# Patient Record
Sex: MALE | Race: WHITE | NOT HISPANIC OR LATINO | URBAN - METROPOLITAN AREA
[De-identification: names, ages, dates, MRNs, and addresses within clinical notes are randomized per-mention and may not be internally consistent; named-entity substitution may affect disease eponyms.]

---

## 2020-08-11 ENCOUNTER — RECORDS - HEALTHEAST (OUTPATIENT)
Dept: LAB | Facility: CLINIC | Age: 65
End: 2020-08-11

## 2020-08-11 LAB
ANION GAP SERPL CALCULATED.3IONS-SCNC: 12 MMOL/L (ref 5–18)
BUN SERPL-MCNC: 11 MG/DL (ref 8–22)
CALCIUM SERPL-MCNC: 10.8 MG/DL (ref 8.5–10.5)
CHLORIDE BLD-SCNC: 104 MMOL/L (ref 98–107)
CO2 SERPL-SCNC: 26 MMOL/L (ref 22–31)
CREAT SERPL-MCNC: 0.85 MG/DL (ref 0.7–1.3)
GFR SERPL CREATININE-BSD FRML MDRD: >60 ML/MIN/1.73M2
GLUCOSE BLD-MCNC: 98 MG/DL (ref 70–125)
LDLC SERPL CALC-MCNC: 132 MG/DL
POTASSIUM BLD-SCNC: 4.8 MMOL/L (ref 3.5–5)
PSA SERPL-MCNC: 2.1 NG/ML (ref 0–4.5)
SODIUM SERPL-SCNC: 142 MMOL/L (ref 136–145)
TSH SERPL DL<=0.005 MIU/L-ACNC: 1.02 UIU/ML (ref 0.3–5)

## 2021-08-02 ENCOUNTER — TRANSCRIBE ORDERS (OUTPATIENT)
Dept: ENDOCRINOLOGY | Facility: CLINIC | Age: 66
End: 2021-08-02

## 2021-08-02 ENCOUNTER — MEDICAL CORRESPONDENCE (OUTPATIENT)
Dept: HEALTH INFORMATION MANAGEMENT | Facility: CLINIC | Age: 66
End: 2021-08-02

## 2021-08-02 DIAGNOSIS — R79.89 LOW TESTOSTERONE: Primary | ICD-10-CM

## 2021-09-01 ENCOUNTER — VIRTUAL VISIT (OUTPATIENT)
Dept: ENDOCRINOLOGY | Facility: CLINIC | Age: 66
End: 2021-09-01
Attending: FAMILY MEDICINE
Payer: COMMERCIAL

## 2021-09-01 DIAGNOSIS — E03.9 HYPOTHYROIDISM, UNSPECIFIED TYPE: Primary | ICD-10-CM

## 2021-09-01 DIAGNOSIS — R79.89 LOW TESTOSTERONE: ICD-10-CM

## 2021-09-01 PROCEDURE — 99204 OFFICE O/P NEW MOD 45 MIN: CPT | Mod: 95 | Performed by: INTERNAL MEDICINE

## 2021-09-01 RX ORDER — LEVOTHYROXINE SODIUM 100 UG/1
TABLET ORAL
COMMUNITY

## 2021-09-01 RX ORDER — LISINOPRIL 10 MG/1
30 TABLET ORAL DAILY
COMMUNITY

## 2021-09-01 RX ORDER — CHLORAL HYDRATE 500 MG
CAPSULE ORAL
COMMUNITY

## 2021-09-01 RX ORDER — LORATADINE 10 MG/1
CAPSULE, LIQUID FILLED ORAL
COMMUNITY

## 2021-09-01 RX ORDER — ATORVASTATIN CALCIUM 40 MG/1
TABLET, FILM COATED ORAL
COMMUNITY
End: 2024-10-04

## 2021-09-01 RX ORDER — MULTIVIT WITH MINERALS/LUTEIN
TABLET ORAL
COMMUNITY

## 2021-09-01 RX ORDER — TESTOSTERONE 40.5 MG/2.5G
GEL TOPICAL
COMMUNITY
Start: 2021-07-29 | End: 2021-12-15

## 2021-09-01 NOTE — PROGRESS NOTES
"Gene is a 65 year old who is being evaluated via a billable video visit.      How would you like to obtain your AVS? Mail a copy  If the video visit is dropped, the invitation should be resent by: Text to cell phone: [phone]  Will anyone else be joining your video visit? No      Video Start Time: 11:27 AM  CC: Low testosterone.     HPI:   Patient presents for evaluation of low testosterone.  ~7  Years ago, he presented with anxiety and being \"overly emotional\". Also notes significant job related stress at that time.   He presented to his primary care and was found to have hypothyroidism and hypogonadism. He was initially on depo-testosterone and then changed to Androgel. Mood improved shortly after medication started. Taking  40.5 mg of testosterone via gel packet once daily (1.62% concentration).     He did have issues with low libido and ED at the time of diagnosis which have resolved.     No history of  surgery.   No TBI.     He has TSAN and uses a CPAP. This was diagnosed around the same time as his hypogonadism.     ROS: 10 point ROS neg other than the symptoms noted above in the HPI.    PMH:   Obesity  Osteopenia  Hypothyroidism  HTN  BANKS  STAN     Meds:  Current Outpatient Medications   Medication     Aspirin 81 MG CAPS     atorvastatin (LIPITOR) 40 MG tablet     fish oil-omega-3 fatty acids 1000 MG capsule     levothyroxine (SYNTHROID) 100 MCG tablet     lisinopril (ZESTRIL) 10 MG tablet     loratadine 10 MG capsule     testosterone (ANDROGEL) 40.5 MG/2.5GM (1.62%) GEL     vitamin C (ASCORBIC ACID) 1000 MG TABS     No current facility-administered medications for this visit.      FHX:   No hsx of clots or prostate cancer.     SHX:  Retired .     Exam:   GENERAL: Healthy, alert and no distress  EYES: Eyes grossly normal to inspection.  No discharge or erythema, or obvious scleral/conjunctival abnormalities.  HENT: Normal cephalic/atraumatic.  External ears, nose and mouth without ulcers or " lesions.  No nasal drainage visible.  RESP: No audible wheeze, cough, or visible cyanosis.  No visible retractions or increased work of breathing.    MS: No gross musculoskeletal defects noted.  Normal range of motion.  No visible edema.  SKIN: Visible skin clear. No significant rash, abnormal pigmentation or lesions.  NEURO: Cranial nerves grossly intact.  Mentation and speech appropriate for age.  PSYCH: Mentation appears normal, affect normal/bright, judgement and insight intact, normal speech and appearance well-groomed.     A/P:   Low testosterone - He baez been on androgel for several years. His body is going to be dependent at this point.Discussed basics of testosterone replacement and what is a reasonable expectation: improvement in libido. Discussed AE's with testosterone. Discussed data on possible increased risk of CV events in patients using testosterone. Discussed how conclusive studies have not been done yet but it does raise concern and caution. He is already on androgel. He has hypothyroidism and is on levothyroxine. We will check a TSH. As his hypothyroidism was diagnosed at the same time as his hypogonadism, I do wonder if his testosterone would have improved on its own after levothyroxine was started. I do no see a prolactin on file so that will need to be checked as well. He has a history of STAN and is using a CPAP. This was untreated prior to his diagnosis of hypogonadism and thus might have been contributing to his low testosterone.   -Schedule labs.   -No change to medication today.   -NEEDS REFILL AFTER COMPLETED.     Hypothyroidism - as above.     Cresencio Bridges M.D    Video-Visit Details    Type of service:  Video Visit    Video End Time:11:52 AM    Originating Location (pt. Location): Home    Distant Location (provider location):  [facility]     Platform used for Video Visit: [employer]

## 2021-09-01 NOTE — LETTER
"    9/1/2021         RE: Miquel Jimenez  [address]  [place] [facility] [address]        Dear Colleague,    Thank you for referring your patient, Miquel Jimenez, to the [facility]. Please see a copy of my visit note below.    Gene is a 65 year old who is being evaluated via a billable video visit.      How would you like to obtain your AVS? Mail a copy  If the video visit is dropped, the invitation should be resent by: Text to cell phone: [phone]  Will anyone else be joining your video visit? No      Video Start Time: 11:27 AM  CC: Low testosterone.     HPI:   Patient presents for evaluation of low testosterone.  ~7  Years ago, he presented with anxiety and being \"overly emotional\". Also notes significant job related stress at that time.   He presented to his primary care and was found to have hypothyroidism and hypogonadism. He was initially on depo-testosterone and then changed to Androgel. Mood improved shortly after medication started. Taking  40.5 mg of testosterone via gel packet once daily (1.62% concentration).     He did have issues with low libido and ED at the time of diagnosis which have resolved.     No history of  surgery.   No TBI.     He has STAN and uses a CPAP. This was diagnosed around the same time as his hypogonadism.     ROS: 10 point ROS neg other than the symptoms noted above in the HPI.    PMH:   Obesity  Osteopenia  Hypothyroidism  HTN  BANKS  STAN     Meds:  Current Outpatient Medications   Medication     Aspirin 81 MG CAPS     atorvastatin (LIPITOR) 40 MG tablet     fish oil-omega-3 fatty acids 1000 MG capsule     levothyroxine (SYNTHROID) 100 MCG tablet     lisinopril (ZESTRIL) 10 MG tablet     loratadine 10 MG capsule     testosterone (ANDROGEL) 40.5 MG/2.5GM (1.62%) GEL     vitamin C (ASCORBIC ACID) 1000 MG TABS     No current facility-administered medications for this visit.      FHX:   No hsx of clots or prostate cancer.     SHX:  Retired .     Exam: "   GENERAL: Healthy, alert and no distress  EYES: Eyes grossly normal to inspection.  No discharge or erythema, or obvious scleral/conjunctival abnormalities.  HENT: Normal cephalic/atraumatic.  External ears, nose and mouth without ulcers or lesions.  No nasal drainage visible.  RESP: No audible wheeze, cough, or visible cyanosis.  No visible retractions or increased work of breathing.    MS: No gross musculoskeletal defects noted.  Normal range of motion.  No visible edema.  SKIN: Visible skin clear. No significant rash, abnormal pigmentation or lesions.  NEURO: Cranial nerves grossly intact.  Mentation and speech appropriate for age.  PSYCH: Mentation appears normal, affect normal/bright, judgement and insight intact, normal speech and appearance well-groomed.     A/P:   Low testosterone - He baez been on androgel for several years. His body is going to be dependent at this point.Discussed basics of testosterone replacement and what is a reasonable expectation: improvement in libido. Discussed AE's with testosterone. Discussed data on possible increased risk of CV events in patients using testosterone. Discussed how conclusive studies have not been done yet but it does raise concern and caution. He is already on androgel. He has hypothyroidism and is on levothyroxine. We will check a TSH. As his hypothyroidism was diagnosed at the same time as his hypogonadism, I do wonder if his testosterone would have improved on its own after levothyroxine was started. I do no see a prolactin on file so that will need to be checked as well. He has a history of STAN and is using a CPAP. This was untreated prior to his diagnosis of hypogonadism and thus might have been contributing to his low testosterone.   -Schedule labs.   -No change to medication today.   -NEEDS REFILL AFTER COMPLETED.     Hypothyroidism - as above.     Cresencio Bridges M.D    Video-Visit Details    Type of service:  Video Visit    Video End Time:11:52  AM    Originating Location (pt. Location): Home    Distant Location (provider location):  [facility]     Platform used for Video Visit: [employer]        Again, thank you for allowing me to participate in the care of your patient.        Sincerely,        Cresencio Bridges MD

## 2021-09-08 ENCOUNTER — LAB (OUTPATIENT)
Dept: LAB | Facility: CLINIC | Age: 66
End: 2021-09-08
Payer: COMMERCIAL

## 2021-09-08 DIAGNOSIS — R79.89 LOW TESTOSTERONE: ICD-10-CM

## 2021-09-08 DIAGNOSIS — E03.9 HYPOTHYROIDISM, UNSPECIFIED TYPE: ICD-10-CM

## 2021-09-08 LAB
HGB BLD-MCNC: 16.9 G/DL (ref 13.3–17.7)
PROLACTIN SERPL-MCNC: 7 UG/L (ref 2–18)
PSA SERPL-MCNC: 2.37 UG/L (ref 0–4)
SHBG SERPL-SCNC: 17 NMOL/L (ref 11–80)
TSH SERPL DL<=0.005 MIU/L-ACNC: 0.69 MU/L (ref 0.4–4)

## 2021-09-08 PROCEDURE — 84270 ASSAY OF SEX HORMONE GLOBUL: CPT

## 2021-09-08 PROCEDURE — 84403 ASSAY OF TOTAL TESTOSTERONE: CPT

## 2021-09-08 PROCEDURE — 36415 COLL VENOUS BLD VENIPUNCTURE: CPT

## 2021-09-08 PROCEDURE — 85018 HEMOGLOBIN: CPT

## 2021-09-08 PROCEDURE — G0103 PSA SCREENING: HCPCS

## 2021-09-08 PROCEDURE — 84443 ASSAY THYROID STIM HORMONE: CPT

## 2021-09-08 PROCEDURE — 84146 ASSAY OF PROLACTIN: CPT

## 2021-09-10 LAB
SHBG SERPL-SCNC: 17 NMOL/L (ref 11–80)
TESTOST FREE SERPL-MCNC: 9.35 NG/DL
TESTOST SERPL-MCNC: 339 NG/DL (ref 240–950)

## 2021-10-16 ENCOUNTER — HEALTH MAINTENANCE LETTER (OUTPATIENT)
Age: 66
End: 2021-10-16

## 2021-10-19 ENCOUNTER — LAB REQUISITION (OUTPATIENT)
Dept: LAB | Facility: CLINIC | Age: 66
End: 2021-10-19
Payer: COMMERCIAL

## 2021-10-19 DIAGNOSIS — E78.5 HYPERLIPIDEMIA, UNSPECIFIED: ICD-10-CM

## 2021-10-19 DIAGNOSIS — R79.89 OTHER SPECIFIED ABNORMAL FINDINGS OF BLOOD CHEMISTRY: ICD-10-CM

## 2021-10-19 DIAGNOSIS — I10 ESSENTIAL (PRIMARY) HYPERTENSION: ICD-10-CM

## 2021-10-19 LAB
ANION GAP SERPL CALCULATED.3IONS-SCNC: 11 MMOL/L (ref 5–18)
BUN SERPL-MCNC: 14 MG/DL (ref 8–22)
CALCIUM SERPL-MCNC: 10.5 MG/DL (ref 8.5–10.5)
CALCIUM, IONIZED MEASURED: 1.3 MMOL/L (ref 1.11–1.3)
CHLORIDE BLD-SCNC: 108 MMOL/L (ref 98–107)
CHOLEST SERPL-MCNC: 191 MG/DL
CO2 SERPL-SCNC: 20 MMOL/L (ref 22–31)
CREAT SERPL-MCNC: 1.07 MG/DL (ref 0.7–1.3)
GFR SERPL CREATININE-BSD FRML MDRD: 72 ML/MIN/1.73M2
GLUCOSE BLD-MCNC: 91 MG/DL (ref 70–125)
HDLC SERPL-MCNC: 40 MG/DL
ION CA PH 7.4: 1.3 MMOL/L (ref 1.11–1.3)
LDLC SERPL CALC-MCNC: 115 MG/DL
PH: 7.4 (ref 7.35–7.45)
POTASSIUM BLD-SCNC: 4.2 MMOL/L (ref 3.5–5)
PTH-INTACT SERPL-MCNC: 96 PG/ML (ref 10–86)
SODIUM SERPL-SCNC: 139 MMOL/L (ref 136–145)
TRIGL SERPL-MCNC: 178 MG/DL

## 2021-10-19 PROCEDURE — 80048 BASIC METABOLIC PNL TOTAL CA: CPT | Mod: ORL | Performed by: FAMILY MEDICINE

## 2021-10-19 PROCEDURE — 82330 ASSAY OF CALCIUM: CPT | Mod: ORL | Performed by: FAMILY MEDICINE

## 2021-10-19 PROCEDURE — 83970 ASSAY OF PARATHORMONE: CPT | Mod: ORL | Performed by: FAMILY MEDICINE

## 2021-10-19 PROCEDURE — 80061 LIPID PANEL: CPT | Mod: ORL | Performed by: FAMILY MEDICINE

## 2021-11-30 ASSESSMENT — ENCOUNTER SYMPTOMS
BACK PAIN: 1
ARTHRALGIAS: 1

## 2021-12-03 ENCOUNTER — VIRTUAL VISIT (OUTPATIENT)
Dept: ENDOCRINOLOGY | Facility: CLINIC | Age: 66
End: 2021-12-03
Payer: COMMERCIAL

## 2021-12-03 DIAGNOSIS — R79.89 LOW TESTOSTERONE: ICD-10-CM

## 2021-12-03 DIAGNOSIS — R53.83 FATIGUE, UNSPECIFIED TYPE: ICD-10-CM

## 2021-12-03 DIAGNOSIS — E03.9 HYPOTHYROIDISM, UNSPECIFIED TYPE: Primary | ICD-10-CM

## 2021-12-03 DIAGNOSIS — Z12.5 ENCOUNTER FOR SCREENING FOR MALIGNANT NEOPLASM OF PROSTATE: ICD-10-CM

## 2021-12-03 DIAGNOSIS — R79.89 ELEVATED PARATHYROID HORMONE: ICD-10-CM

## 2021-12-03 PROCEDURE — 99214 OFFICE O/P EST MOD 30 MIN: CPT | Mod: 95 | Performed by: INTERNAL MEDICINE

## 2021-12-03 NOTE — PROGRESS NOTES
"Gene is a 66 year old who is being evaluated via a billable video visit.      How would you like to obtain your AVS? MyChart  If the video visit is dropped, the invitation should be resent by: Text to cell phone: [phone]   Will anyone else be joining your video visit? No      Video Start Time: 8:59 AM     S:   Patient presents for evaluation of low testosterone.  ~7  Years ago, he presented with anxiety and being \"overly emotional\". Also notes significant job related stress at that time.   He presented to his primary care and was found to have hypothyroidism and hypogonadism. He was initially on depo-testosterone and then changed to Androgel. Mood improved shortly after medication started. Taking  40.5 mg of testosterone via gel packet once daily (1.62% concentration).     He did have issues with low libido and ED at the time of diagnosis which have resolved.     No history of  surgery.   No TBI.     He has STAN and uses a CPAP. This was diagnosed around the same time as his hypogonadism.     In 12/2021, continues levothyroxine 100 mcg every day and 40.5 mg of testosterone via gel packet once daily (1.62% concentration). He is able to get erections but not as frequently as he would like. He has not used viagra. Libido is a little lower than when we last spoke. Also notes low motivation. Wants to start exercising, he is gaining weight, but lacks motivation. Sleeping 6-7 hours a night. Occasionally wakes with back pain. After drinking coffee, he is able to go about his day.     In retrospect, the low motivation began prior to our last visit. Not any worse but not improving.     Answers for HPI/ROS submitted by the patient on 11/30/2021  General Symptoms: No  Skin Symptoms: No  HENT Symptoms: No  EYE SYMPTOMS: No  HEART SYMPTOMS: No  LUNG SYMPTOMS: No  INTESTINAL SYMPTOMS: No  URINARY SYMPTOMS: No  REPRODUCTIVE SYMPTOMS: No  SKELETAL SYMPTOMS: Yes  BLOOD SYMPTOMS: No  NERVOUS SYSTEM SYMPTOMS: No  MENTAL HEALTH " SYMPTOMS: No  Back pain: Yes  Joint pain: Yes      ROS: 10 point ROS neg other than the symptoms noted above in the HPI.    Exam:   GENERAL: Healthy, alert and no distress  EYES: Eyes grossly normal to inspection.  No discharge or erythema, or obvious scleral/conjunctival abnormalities.  HENT: Normal cephalic/atraumatic.  External ears, nose and mouth without ulcers or lesions.  No nasal drainage visible.  RESP: No audible wheeze, cough, or visible cyanosis.  No visible retractions or increased work of breathing.    MS: No gross musculoskeletal defects noted.  Normal range of motion.  No visible edema.  SKIN: Visible skin clear. No significant rash, abnormal pigmentation or lesions.  NEURO: Cranial nerves grossly intact.  Mentation and speech appropriate for age.  PSYCH: Mentation appears normal, affect normal/bright, judgement and insight intact, normal speech and appearance well-groomed.     A/P:   Low testosterone - He baez been on androgel for several years. His body is going to be dependent at this point.Discussed basics of testosterone replacement and what is a reasonable expectation: improvement in libido. Discussed AE's with testosterone. Discussed data on possible increased risk of CV events in patients using testosterone. Discussed how conclusive studies have not been done yet but it does raise concern and caution. He is already on androgel. He has hypothyroidism and is on levothyroxine. We will check a TSH. As his hypothyroidism was diagnosed at the same time as his hypogonadism, I do wonder if his testosterone would have improved on its own after levothyroxine was started. I do no see a prolactin on file so that will need to be checked as well. He has a history of STAN and is using a CPAP. This was untreated prior to his diagnosis of hypogonadism and thus might have been contributing to his low testosterone.   TSH, prolactin, Hgb, testosterone, PSA all normal in 9/2021.   In 12/2021, feeling lower libido  and lack of motivation. Mild elevation in calcium and PTH on labs from 10/2021.   -No change to testosterone or levothyroxine today.   -Labs for CMP, CBC, iPTH, vitamin D, B12, testosterone, PSA, Hgb, TSH.    Hypothyroidism - as above.       Video-Visit Details    Type of service:  Video Visit    Video End Time:9:19 AM    Originating Location (pt. Location): Home    Distant Location (provider location):  [facility]     Platform used for Video Visit: Teresa Bridges MD on 12/3/2021 at 9:19 AM

## 2021-12-03 NOTE — LETTER
"    12/3/2021         RE: Miquel Jimenez  [address]  [place] [address]        Dear Colleague,    Thank you for referring your patient, Miquel Jimenez, to the [facility]. Please see a copy of my visit note below.    Gene is a 66 year old who is being evaluated via a billable video visit.      How would you like to obtain your AVS? MyChart  If the video visit is dropped, the invitation should be resent by: Text to cell phone: [phone]   Will anyone else be joining your video visit? No      Video Start Time: 8:59 AM     S:   Patient presents for evaluation of low testosterone.  ~7  Years ago, he presented with anxiety and being \"overly emotional\". Also notes significant job related stress at that time.   He presented to his primary care and was found to have hypothyroidism and hypogonadism. He was initially on depo-testosterone and then changed to Androgel. Mood improved shortly after medication started. Taking  40.5 mg of testosterone via gel packet once daily (1.62% concentration).     He did have issues with low libido and ED at the time of diagnosis which have resolved.     No history of  surgery.   No TBI.     He has STAN and uses a CPAP. This was diagnosed around the same time as his hypogonadism.     In 12/2021, continues levothyroxine 100 mcg every day and 40.5 mg of testosterone via gel packet once daily (1.62% concentration). He is able to get erections but not as frequently as he would like. He has not used viagra. Libido is a little lower than when we last spoke. Also notes low motivation. Wants to start exercising, he is gaining weight, but lacks motivation. Sleeping 6-7 hours a night. Occasionally wakes with back pain. After drinking coffee, he is able to go about his day.     In retrospect, the low motivation began prior to our last visit. Not any worse but not improving.     Answers for HPI/ROS submitted by the patient on 11/30/2021  General Symptoms: No  Skin " Symptoms: No  HENT Symptoms: No  EYE SYMPTOMS: No  HEART SYMPTOMS: No  LUNG SYMPTOMS: No  INTESTINAL SYMPTOMS: No  URINARY SYMPTOMS: No  REPRODUCTIVE SYMPTOMS: No  SKELETAL SYMPTOMS: Yes  BLOOD SYMPTOMS: No  NERVOUS SYSTEM SYMPTOMS: No  MENTAL HEALTH SYMPTOMS: No  Back pain: Yes  Joint pain: Yes      ROS: 10 point ROS neg other than the symptoms noted above in the HPI.    Exam:   GENERAL: Healthy, alert and no distress  EYES: Eyes grossly normal to inspection.  No discharge or erythema, or obvious scleral/conjunctival abnormalities.  HENT: Normal cephalic/atraumatic.  External ears, nose and mouth without ulcers or lesions.  No nasal drainage visible.  RESP: No audible wheeze, cough, or visible cyanosis.  No visible retractions or increased work of breathing.    MS: No gross musculoskeletal defects noted.  Normal range of motion.  No visible edema.  SKIN: Visible skin clear. No significant rash, abnormal pigmentation or lesions.  NEURO: Cranial nerves grossly intact.  Mentation and speech appropriate for age.  PSYCH: Mentation appears normal, affect normal/bright, judgement and insight intact, normal speech and appearance well-groomed.     A/P:   Low testosterone - He baez been on androgel for several years. His body is going to be dependent at this point.Discussed basics of testosterone replacement and what is a reasonable expectation: improvement in libido. Discussed AE's with testosterone. Discussed data on possible increased risk of CV events in patients using testosterone. Discussed how conclusive studies have not been done yet but it does raise concern and caution. He is already on androgel. He has hypothyroidism and is on levothyroxine. We will check a TSH. As his hypothyroidism was diagnosed at the same time as his hypogonadism, I do wonder if his testosterone would have improved on its own after levothyroxine was started. I do no see a prolactin on file so that will need to be checked as well. He has a  history of STAN and is using a CPAP. This was untreated prior to his diagnosis of hypogonadism and thus might have been contributing to his low testosterone.   TSH, prolactin, Hgb, testosterone, PSA all normal in 9/2021.   In 12/2021, feeling lower libido and lack of motivation. Mild elevation in calcium and PTH on labs from 10/2021.   -No change to testosterone or levothyroxine today.   -Labs for CMP, CBC, iPTH, vitamin D, B12, testosterone, PSA, Hgb, TSH.    Hypothyroidism - as above.       Video-Visit Details    Type of service:  Video Visit    Video End Time:9:19 AM    Originating Location (pt. Location): Home    Distant Location (provider location):  [facility]     Platform used for Video Visit: [facility]     Cresencio Bridges MD on 12/3/2021 at 9:19 AM        Again, thank you for allowing me to participate in the care of your patient.        Sincerely,        Cresencio Bridges MD

## 2021-12-14 ENCOUNTER — LAB (OUTPATIENT)
Dept: LAB | Facility: CLINIC | Age: 66
End: 2021-12-14
Payer: COMMERCIAL

## 2021-12-14 DIAGNOSIS — R53.83 FATIGUE, UNSPECIFIED TYPE: ICD-10-CM

## 2021-12-14 DIAGNOSIS — Z12.5 ENCOUNTER FOR SCREENING FOR MALIGNANT NEOPLASM OF PROSTATE: ICD-10-CM

## 2021-12-14 DIAGNOSIS — R79.89 LOW TESTOSTERONE: ICD-10-CM

## 2021-12-14 DIAGNOSIS — R79.89 ELEVATED PARATHYROID HORMONE: ICD-10-CM

## 2021-12-14 DIAGNOSIS — E03.9 HYPOTHYROIDISM, UNSPECIFIED TYPE: ICD-10-CM

## 2021-12-14 LAB
BASOPHILS # BLD AUTO: 0 10E3/UL (ref 0–0.2)
BASOPHILS NFR BLD AUTO: 1 %
DEPRECATED CALCIDIOL+CALCIFEROL SERPL-MC: 25 UG/L (ref 20–75)
EOSINOPHIL # BLD AUTO: 0.2 10E3/UL (ref 0–0.7)
EOSINOPHIL NFR BLD AUTO: 4 %
ERYTHROCYTE [DISTWIDTH] IN BLOOD BY AUTOMATED COUNT: 12.7 % (ref 10–15)
HCT VFR BLD AUTO: 48.8 % (ref 40–53)
HGB BLD-MCNC: 16.9 G/DL (ref 13.3–17.7)
IMM GRANULOCYTES # BLD: 0.1 10E3/UL
IMM GRANULOCYTES NFR BLD: 1 %
LYMPHOCYTES # BLD AUTO: 1.4 10E3/UL (ref 0.8–5.3)
LYMPHOCYTES NFR BLD AUTO: 26 %
MCH RBC QN AUTO: 31 PG (ref 26.5–33)
MCHC RBC AUTO-ENTMCNC: 34.6 G/DL (ref 31.5–36.5)
MCV RBC AUTO: 89 FL (ref 78–100)
MONOCYTES # BLD AUTO: 0.4 10E3/UL (ref 0–1.3)
MONOCYTES NFR BLD AUTO: 8 %
NEUTROPHILS # BLD AUTO: 3.2 10E3/UL (ref 1.6–8.3)
NEUTROPHILS NFR BLD AUTO: 61 %
PLATELET # BLD AUTO: 186 10E3/UL (ref 150–450)
PTH-INTACT SERPL-MCNC: 58 PG/ML (ref 18–80)
RBC # BLD AUTO: 5.46 10E6/UL (ref 4.4–5.9)
SHBG SERPL-SCNC: 20 NMOL/L (ref 11–80)
VIT B12 SERPL-MCNC: 260 PG/ML (ref 193–986)
WBC # BLD AUTO: 5.3 10E3/UL (ref 4–11)

## 2021-12-14 PROCEDURE — 36415 COLL VENOUS BLD VENIPUNCTURE: CPT

## 2021-12-14 PROCEDURE — G0103 PSA SCREENING: HCPCS

## 2021-12-14 PROCEDURE — 84403 ASSAY OF TOTAL TESTOSTERONE: CPT

## 2021-12-14 PROCEDURE — 82248 BILIRUBIN DIRECT: CPT

## 2021-12-14 PROCEDURE — 84443 ASSAY THYROID STIM HORMONE: CPT

## 2021-12-14 PROCEDURE — 80053 COMPREHEN METABOLIC PANEL: CPT

## 2021-12-14 PROCEDURE — 85025 COMPLETE CBC W/AUTO DIFF WBC: CPT

## 2021-12-14 PROCEDURE — 82607 VITAMIN B-12: CPT

## 2021-12-14 PROCEDURE — 84270 ASSAY OF SEX HORMONE GLOBUL: CPT

## 2021-12-14 PROCEDURE — 84100 ASSAY OF PHOSPHORUS: CPT

## 2021-12-14 PROCEDURE — 83970 ASSAY OF PARATHORMONE: CPT

## 2021-12-14 PROCEDURE — 82306 VITAMIN D 25 HYDROXY: CPT

## 2021-12-14 PROCEDURE — 83735 ASSAY OF MAGNESIUM: CPT

## 2021-12-15 DIAGNOSIS — Z12.5 ENCOUNTER FOR SCREENING FOR MALIGNANT NEOPLASM OF PROSTATE: ICD-10-CM

## 2021-12-15 DIAGNOSIS — R79.89 ELEVATED PARATHYROID HORMONE: ICD-10-CM

## 2021-12-15 DIAGNOSIS — R79.89 LOW TESTOSTERONE: Primary | ICD-10-CM

## 2021-12-15 DIAGNOSIS — E21.1 SECONDARY HYPERPARATHYROIDISM, NOT ELSEWHERE CLASSIFIED (H): ICD-10-CM

## 2021-12-15 LAB
ALBUMIN SERPL-MCNC: 4 G/DL (ref 3.4–5)
ALP SERPL-CCNC: 98 U/L (ref 40–150)
ALT SERPL W P-5'-P-CCNC: 70 U/L (ref 0–70)
ANION GAP SERPL CALCULATED.3IONS-SCNC: 7 MMOL/L (ref 3–14)
AST SERPL W P-5'-P-CCNC: 23 U/L (ref 0–45)
BILIRUB DIRECT SERPL-MCNC: 0.2 MG/DL (ref 0–0.2)
BILIRUB SERPL-MCNC: 0.7 MG/DL (ref 0.2–1.3)
BUN SERPL-MCNC: 10 MG/DL (ref 7–30)
CALCIUM SERPL-MCNC: 10 MG/DL (ref 8.5–10.1)
CHLORIDE BLD-SCNC: 108 MMOL/L (ref 94–109)
CO2 SERPL-SCNC: 25 MMOL/L (ref 20–32)
CREAT SERPL-MCNC: 0.82 MG/DL (ref 0.66–1.25)
GFR SERPL CREATININE-BSD FRML MDRD: >90 ML/MIN/1.73M2
GLUCOSE BLD-MCNC: 99 MG/DL (ref 70–99)
MAGNESIUM SERPL-MCNC: 2.3 MG/DL (ref 1.6–2.3)
PHOSPHATE SERPL-MCNC: 2.8 MG/DL (ref 2.5–4.5)
POTASSIUM BLD-SCNC: 4 MMOL/L (ref 3.4–5.3)
PROT SERPL-MCNC: 7.2 G/DL (ref 6.8–8.8)
PSA SERPL-MCNC: 2.57 UG/L (ref 0–4)
SODIUM SERPL-SCNC: 140 MMOL/L (ref 133–144)
TESTOST FREE SERPL-MCNC: 5.87 NG/DL
TESTOST SERPL-MCNC: 235 NG/DL (ref 240–950)
TSH SERPL DL<=0.005 MIU/L-ACNC: 1.36 MU/L (ref 0.4–4)

## 2021-12-15 RX ORDER — TESTOSTERONE 40.5 MG/2.5G
81 GEL TOPICAL DAILY
Qty: 60 PACKET | Refills: 5 | Status: SHIPPED | OUTPATIENT
Start: 2021-12-15 | End: 2022-07-07

## 2022-01-17 DIAGNOSIS — N52.9 ERECTILE DYSFUNCTION, UNSPECIFIED ERECTILE DYSFUNCTION TYPE: Primary | ICD-10-CM

## 2022-01-17 RX ORDER — SILDENAFIL 100 MG/1
50-100 TABLET, FILM COATED ORAL DAILY PRN
Qty: 10 TABLET | Refills: 11 | Status: SHIPPED | OUTPATIENT
Start: 2022-01-17 | End: 2023-06-19

## 2022-06-03 ENCOUNTER — LAB REQUISITION (OUTPATIENT)
Dept: LAB | Facility: CLINIC | Age: 67
End: 2022-06-03
Payer: COMMERCIAL

## 2022-06-03 DIAGNOSIS — I10 ESSENTIAL (PRIMARY) HYPERTENSION: ICD-10-CM

## 2022-06-03 DIAGNOSIS — E03.9 HYPOTHYROIDISM, UNSPECIFIED: ICD-10-CM

## 2022-06-03 DIAGNOSIS — E29.1 TESTICULAR HYPOFUNCTION: ICD-10-CM

## 2022-06-03 DIAGNOSIS — Z12.5 ENCOUNTER FOR SCREENING FOR MALIGNANT NEOPLASM OF PROSTATE: ICD-10-CM

## 2022-06-03 LAB
ANION GAP SERPL CALCULATED.3IONS-SCNC: 10 MMOL/L (ref 5–18)
BUN SERPL-MCNC: 12 MG/DL (ref 8–22)
CALCIUM SERPL-MCNC: 10.2 MG/DL (ref 8.5–10.5)
CHLORIDE BLD-SCNC: 106 MMOL/L (ref 98–107)
CHOLEST SERPL-MCNC: 179 MG/DL
CO2 SERPL-SCNC: 24 MMOL/L (ref 22–31)
CREAT SERPL-MCNC: 0.76 MG/DL (ref 0.7–1.3)
GFR SERPL CREATININE-BSD FRML MDRD: >90 ML/MIN/1.73M2
GLUCOSE BLD-MCNC: 95 MG/DL (ref 70–125)
HDLC SERPL-MCNC: 41 MG/DL
LDLC SERPL CALC-MCNC: 104 MG/DL
POTASSIUM BLD-SCNC: 4.3 MMOL/L (ref 3.5–5)
PSA SERPL-MCNC: 3.37 UG/L (ref 0–4.5)
SODIUM SERPL-SCNC: 140 MMOL/L (ref 136–145)
TRIGL SERPL-MCNC: 172 MG/DL
TSH SERPL DL<=0.005 MIU/L-ACNC: 1.25 UIU/ML (ref 0.3–5)

## 2022-06-03 PROCEDURE — G0103 PSA SCREENING: HCPCS | Mod: ORL | Performed by: FAMILY MEDICINE

## 2022-06-03 PROCEDURE — 80061 LIPID PANEL: CPT | Mod: ORL | Performed by: FAMILY MEDICINE

## 2022-06-03 PROCEDURE — 84443 ASSAY THYROID STIM HORMONE: CPT | Mod: ORL | Performed by: FAMILY MEDICINE

## 2022-06-03 PROCEDURE — 84403 ASSAY OF TOTAL TESTOSTERONE: CPT | Performed by: FAMILY MEDICINE

## 2022-06-03 PROCEDURE — 84403 ASSAY OF TOTAL TESTOSTERONE: CPT | Mod: ORL | Performed by: FAMILY MEDICINE

## 2022-06-03 PROCEDURE — G0103 PSA SCREENING: HCPCS | Performed by: FAMILY MEDICINE

## 2022-06-03 PROCEDURE — 80048 BASIC METABOLIC PNL TOTAL CA: CPT | Mod: ORL | Performed by: FAMILY MEDICINE

## 2022-06-03 PROCEDURE — 80048 BASIC METABOLIC PNL TOTAL CA: CPT | Performed by: FAMILY MEDICINE

## 2022-06-03 PROCEDURE — 80061 LIPID PANEL: CPT | Performed by: FAMILY MEDICINE

## 2022-06-03 PROCEDURE — 84443 ASSAY THYROID STIM HORMONE: CPT | Performed by: FAMILY MEDICINE

## 2022-06-06 LAB — TESTOST SERPL-MCNC: 521 NG/DL (ref 221–716)

## 2022-07-06 DIAGNOSIS — R79.89 LOW TESTOSTERONE: ICD-10-CM

## 2022-07-06 NOTE — TELEPHONE ENCOUNTER
Requested Prescriptions   Pending Prescriptions Disp Refills     testosterone (ANDROGEL) 40.5 MG/2.5GM (1.62%) GEL 60 packet 5     Sig: Place 2 packets (81 mg) onto the skin daily       There is no refill protocol information for this order        Last office visit: Visit date not found with prescribing provider:  Dr. Bridges    Future Office Visit:          Eric Urias  [facility]

## 2022-07-07 RX ORDER — TESTOSTERONE 40.5 MG/2.5G
81 GEL TOPICAL DAILY
Qty: 60 PACKET | Refills: 0 | Status: SHIPPED | OUTPATIENT
Start: 2022-07-07 | End: 2022-08-11

## 2022-07-07 NOTE — TELEPHONE ENCOUNTER
Testosterone refilled. Patient needs to have follow-up scheduled to establish with new endocrinologist--will forward request to clinic coordinators.  Sukumar Yuan MD 7/7/2022 9:29 AM

## 2022-07-07 NOTE — TELEPHONE ENCOUNTER
testosterone (ANDROGEL) 40.5 MG/2.5GM (1.62%) GEL  Last Written Prescription Date:  12/15/2021  Last Fill Quantity: 60,   # refills: 5  Last Office Visit : 12/3/2021  Future Office visit:  None    Routing refill request to provider for review/approval because:  Dr. Bridges Pt who is gone  Refer to Advice Provider for further review and refills for Pt care.       Yulia Freeman RN  Central Triage Red Flags/Med Refills

## 2022-08-01 DIAGNOSIS — R79.89 LOW TESTOSTERONE: ICD-10-CM

## 2022-08-03 RX ORDER — TESTOSTERONE 40.5 MG/2.5G
81 GEL TOPICAL DAILY
Qty: 60 PACKET | Refills: 0 | OUTPATIENT
Start: 2022-08-03

## 2022-08-03 NOTE — TELEPHONE ENCOUNTER
Patient received 30 day refill 7/7 without scheduling appointment as requested at that time.   No further refills will be given until appointment scheduled/reestablishes care. Last visit with previous provider 12/15/2021

## 2022-08-03 NOTE — TELEPHONE ENCOUNTER
testosterone (ANDROGEL) 40.5 MG/2.5GM (1.62%) GEL      Last Written Prescription Date:  7-7-22  Last Fill Quantity: 60 packets,   # refills: 0  Last Office Visit : 12-3-21 Yuliana  Future Office visit:  none    Routing refill request to provider for review/approval because:  Drug not on the [facility], [facility] or  Health refill protocol or controlled substance  Yuliana pt  Previous hay RF, no appt scheduled.   ( last rx did NOT state RTC instructions)

## 2022-08-11 DIAGNOSIS — R79.89 LOW TESTOSTERONE: ICD-10-CM

## 2022-08-11 RX ORDER — TESTOSTERONE 40.5 MG/2.5G
81 GEL TOPICAL DAILY
Qty: 60 PACKET | Refills: 3 | Status: SHIPPED | OUTPATIENT
Start: 2022-08-11 | End: 2022-12-08

## 2022-08-11 NOTE — TELEPHONE ENCOUNTER
testosterone (ANDROGEL) 40.5 MG/2.5GM (1.62%) GEL  Last Written Prescription Date:   7/7/2022  Last Fill Quantity: 60,   # refills: 0  Last Office Visit :  12/3/2021  Future Office visit:   9/22/2022    Routing refill request to provider for review/approval because:  Pt has visit on 9/22/2022  Med not on protocol  Dr. Bridges Pt who is gone.   Refer to Advice Provider for review       Yulia Freeman RN  Central Triage Red Flags/Med Refills

## 2022-09-22 ENCOUNTER — VIRTUAL VISIT (OUTPATIENT)
Dept: ENDOCRINOLOGY | Facility: CLINIC | Age: 67
End: 2022-09-22
Payer: COMMERCIAL

## 2022-09-22 DIAGNOSIS — R79.89 LOW TESTOSTERONE: Primary | ICD-10-CM

## 2022-09-22 PROCEDURE — 99215 OFFICE O/P EST HI 40 MIN: CPT | Mod: 95 | Performed by: STUDENT IN AN ORGANIZED HEALTH CARE EDUCATION/TRAINING PROGRAM

## 2022-09-22 RX ORDER — CELECOXIB 200 MG/1
200 CAPSULE ORAL DAILY
COMMUNITY
Start: 2022-08-29

## 2022-09-22 RX ORDER — ZINC GLUCONATE 50 MG
50 TABLET ORAL DAILY
COMMUNITY

## 2022-09-22 NOTE — PROGRESS NOTES
Miquel Jimenez is being evaluated via a billable video visit.        How would you like to obtain your AVS? [employer]  For the video visit, send the invitation by: Text to cell phone: [phone]  Will anyone else be joining your video visit? No

## 2022-09-22 NOTE — LETTER
9/22/2022       RE: Miquel Jimenez  [address]  [facility] [address]     Dear Colleague,    Thank you for referring your patient, Miquel Jimenez, to the [facility] ENDOCRINOLOGY CLINIC [facility]. Please see a copy of my visit note below.    Miquel Jimenez is being evaluated via a billable video visit.        How would you like to obtain your AVS? [employer]  For the video visit, send the invitation by: Text to cell phone: [phone]  Will anyone else be joining your video visit? No      Endocrinology Clinic Visit 9/22/2022    Video-Visit Details     Type of service:  Video Visit     Video Start Time: 9:35 am   Video End Time: 10:10 am      I spent a total of 60 minutes on the date of encounter reviewing medical records, evaluating the patient, coordinating care and documenting in the EHR, as detailed above.        Platform used for Video Visit: [employer]     NAME:  Miquel Jimenez  PCP:  Milton Culver  MRN:  [id number]  Reason for Consult:  Hypogonadism and hypothyroidism  Requesting Provider:  Referred Self    Chief Complaint     Chief Complaint   Patient presents with     Follow Up       History of Present Illness     Miquel Jimenez is a 67 year old male who is seen in video visit for hypogonadism and hypothyroidism. Patient was following previously with Dr. Bridges. Last seen 11/2021    He was diagnosed with hypogonadism in 2012 by his PCP and has been on T replacement since then. He presented with low libido, low energy and feeling emotional. He looked at his previous labs ( scanned and attached to [employer] messages), his T level was 249 and his free was normal at 9. No gonadotropin and no other work up. When he established with Dr. Bridges prolactin was checked and was normal.     He was initially on depo-testosterone and then changed to Androgel. Symptoms improved shortly after medication started. Taking  40.5 mg of testosterone via gel  packet once daily (1.62% concentration).      No history of  surgery.   No TBI. Reported previous MRI of brain years ago was done and was normal.     He has STAN and uses a CPAP. This was diagnosed around the same time as his hypogonadism.     He reported being diagnosed with hypothyroidism around the same time. The labs that he showed me back in 2012 had a normal TSH and no FT4 check. He reported being on lt4 at 100 mcg daily since diagnosis. He denied any symptoms of hypo or hyperthyroidism.     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    Problem List     Hypothyroidism  Hypogonadism  STAN  HLD  HTN  Medications     Current Outpatient Medications   Medication     Aspirin 81 MG CAPS     atorvastatin (LIPITOR) 40 MG tablet     celecoxib (CELEBREX) 200 MG capsule     fish oil-omega-3 fatty acids 1000 MG capsule     levothyroxine (SYNTHROID/LEVOTHROID) 100 MCG tablet     lisinopril (ZESTRIL) 10 MG tablet     loratadine 10 MG capsule     sildenafil (VIAGRA) 100 MG tablet     testosterone (ANDROGEL) 40.5 MG/2.5GM (1.62%) GEL     vitamin C (ASCORBIC ACID) 1000 MG TABS     zinc gluconate 50 MG tablet     No current facility-administered medications for this visit.        Allergies     No Known Allergies    Medical / Surgical History   Hypothyroidism  Hypogonadism  STAN  HLD  HTN    Social History     Social History     Socioeconomic History     Marital status:      Spouse name: Not on file     Number of children: Not on file     Years of education: Not on file     Highest education level: Not on file   Occupational History     Not on file   Tobacco Use     Smoking status: Never Smoker     Smokeless tobacco: Former User     Quit date: 1980   Substance and Sexual Activity     Alcohol use: Not on file     Drug use: Not on file     Sexual activity: Not on file   Other Topics Concern     Not on file   Social History Narrative     Not on file     Social Determinants of  Health     Financial Resource Strain: Not on file   Food Insecurity: Not on file   Transportation Needs: Not on file   Physical Activity: Not on file   Stress: Not on file   Social Connections: Not on file   Intimate Partner Violence: Not on file   Housing Stability: Not on file       Family History     Non contributory     ROS     12 ROS completed, pertinent positive and negative in HPI    Physical Exam   There were no vitals taken for this visit.   GENERAL: Healthy, alert and no distress  EYES: Eyes grossly normal to inspection.  No discharge or erythema, or obvious scleral/conjunctival abnormalities.  RESP: No audible wheeze, cough, or visible cyanosis.  No visible retractions or increased work of breathing.    SKIN: Visible skin clear. No significant rash, abnormal pigmentation or lesions.  NEURO: Cranial nerves grossly intact.  Mentation and speech appropriate for age.  PSYCH: Mentation appears normal, affect normal/bright, judgement and insight intact, normal speech and appearance well-groomed.     Labs/Imaging     Pertinent Labs were reviewed and updated in EPIC and discussed briefly.  Radiology Results were  reviewed and updated in EPIC and discussed briefly.    Summary of recent findings:   No results found for: A1C    TSH   Date Value Ref Range Status   06/03/2022 1.25 0.30 - 5.00 uIU/mL Final   12/14/2021 1.36 0.40 - 4.00 mU/L Final   09/08/2021 0.69 0.40 - 4.00 mU/L Final   08/11/2020 1.02 0.30 - 5.00 uIU/mL Final       Creatinine   Date Value Ref Range Status   06/03/2022 0.76 0.70 - 1.30 mg/dL Final       Recent Labs   Lab Test 06/03/22  1128 10/19/21  0956   CHOL 179 191   HDL 41 40    115   TRIG 172* 178*       No results found for: [id number], [id number], [id number]    I personally reviewed the patient's outside records from [facility] EMR and scanned records. Summary of pertinent findings in [facility].    Impression / Plan     1. Hypogonadism  Long history on T replacement since 2012. Diagnosis was not  confirmed at that time, he had slightly low total T with normal Free T. No other hormonal work up. We discussed that at this point after being on T replacment for long time his pituitary access has been suppressed. He prefers to stay on it as his symptoms did improve on it. We reviewed again T side effects including polycythemia and association with CVD and prostate cancer.    Plan:  Due for labs in 3month: T level, cbc and PSA    2. Hypothyroidism  No previous records of abnormal labs. He has been on LT4 100 mcg daily since 2012. Most likely primary hypothyroidism though no labs. Last TSH wnl 6/2022    Plan:  Yearly TFT  Continue LT4 100 mcg daily    Test and/or medications prescribed today:  Orders Placed This Encounter   Procedures     CBC with platelets     PSA tumor marker     Testosterone total         Follow up: 3 month      Deo Still MD  Endocrinology, Diabetes and Metabolism  [facility]

## 2022-09-22 NOTE — PROGRESS NOTES
Endocrinology Clinic Visit 9/22/2022    Video-Visit Details     Type of service:  Video Visit     Video Start Time: 9:35 am   Video End Time: 10:10 am      I spent a total of 60 minutes on the date of encounter reviewing medical records, evaluating the patient, coordinating care and documenting in the EHR, as detailed above.        Platform used for Video Visit: [employer]     NAME:  Miquel Jimenez  PCP:  Milton Culver Dre  MRN:  [id number]  Reason for Consult:  Hypogonadism and hypothyroidism  Requesting Provider:  Referred Self    Chief Complaint     Chief Complaint   Patient presents with     Follow Up       History of Present Illness     Miquel Jimenez is a 67 year old male who is seen in video visit for hypogonadism and hypothyroidism. Patient was following previously with Dr. Bridges. Last seen 11/2021    He was diagnosed with hypogonadism in 2012 by his PCP and has been on T replacement since then. He presented with low libido, low energy and feeling emotional. He looked at his previous labs ( scanned and attached to [employer] messages), his T level was 249 and his free was normal at 9. No gonadotropin and no other work up. When he established with Dr. Bridges prolactin was checked and was normal.     He was initially on depo-testosterone and then changed to Androgel. Symptoms improved shortly after medication started. Taking  40.5 mg of testosterone via gel packet once daily (1.62% concentration).      No history of  surgery.   No TBI. Reported previous MRI of brain years ago was done and was normal.     He has STAN and uses a CPAP. This was diagnosed around the same time as his hypogonadism.     He reported being diagnosed with hypothyroidism around the same time. The labs that he showed me back in 2012 had a normal TSH and no FT4 check. He reported being on lt4 at 100 mcg daily since diagnosis. He denied any symptoms of hypo or hyperthyroidism.     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 Lives with his wife.    Problem List     Hypothyroidism  Hypogonadism  STAN  HLD  HTN  Medications     Current Outpatient Medications   Medication     Aspirin 81 MG CAPS     atorvastatin (LIPITOR) 40 MG tablet     celecoxib (CELEBREX) 200 MG capsule     fish oil-omega-3 fatty acids 1000 MG capsule     levothyroxine (SYNTHROID/LEVOTHROID) 100 MCG tablet     lisinopril (ZESTRIL) 10 MG tablet     loratadine 10 MG capsule     sildenafil (VIAGRA) 100 MG tablet     testosterone (ANDROGEL) 40.5 MG/2.5GM (1.62%) GEL     vitamin C (ASCORBIC ACID) 1000 MG TABS     zinc gluconate 50 MG tablet     No current facility-administered medications for this visit.        Allergies     No Known Allergies    Medical / Surgical History   Hypothyroidism  Hypogonadism  STAN  HLD  HTN    Social History     Social History     Socioeconomic History     Marital status:      Spouse name: Not on file     Number of children: Not on file     Years of education: Not on file     Highest education level: Not on file   Occupational History     Not on file   Tobacco Use     Smoking status: Never Smoker     Smokeless tobacco: Former User     Quit date: 1980   Substance and Sexual Activity     Alcohol use: Not on file     Drug use: Not on file     Sexual activity: Not on file   Other Topics Concern     Not on file   Social History Narrative     Not on file     Social Determinants of Health     Financial Resource Strain: Not on file   Food Insecurity: Not on file   Transportation Needs: Not on file   Physical Activity: Not on file   Stress: Not on file   Social Connections: Not on file   Intimate Partner Violence: Not on file   Housing Stability: Not on file       Family History     Non contributory     ROS     12 ROS completed, pertinent positive and negative in HPI    Physical Exam   There were no vitals taken for this visit.   GENERAL: Healthy, alert and no distress  EYES: Eyes grossly normal to inspection.  No discharge or erythema, or  obvious scleral/conjunctival abnormalities.  RESP: No audible wheeze, cough, or visible cyanosis.  No visible retractions or increased work of breathing.    SKIN: Visible skin clear. No significant rash, abnormal pigmentation or lesions.  NEURO: Cranial nerves grossly intact.  Mentation and speech appropriate for age.  PSYCH: Mentation appears normal, affect normal/bright, judgement and insight intact, normal speech and appearance well-groomed.     Labs/Imaging     Pertinent Labs were reviewed and updated in EPIC and discussed briefly.  Radiology Results were  reviewed and updated in EPIC and discussed briefly.    Summary of recent findings:   No results found for: A1C    TSH   Date Value Ref Range Status   06/03/2022 1.25 0.30 - 5.00 uIU/mL Final   12/14/2021 1.36 0.40 - 4.00 mU/L Final   09/08/2021 0.69 0.40 - 4.00 mU/L Final   08/11/2020 1.02 0.30 - 5.00 uIU/mL Final       Creatinine   Date Value Ref Range Status   06/03/2022 0.76 0.70 - 1.30 mg/dL Final       Recent Labs   Lab Test 06/03/22  1128 10/19/21  0956   CHOL 179 191   HDL 41 40    115   TRIG 172* 178*       No results found for: [id number], [id number], [id number]    I personally reviewed the patient's outside records from [facility] EMR and scanned records. Summary of pertinent findings in [facility].    Impression / Plan     1. Hypogonadism  Long history on T replacement since 2012. Diagnosis was not confirmed at that time, he had slightly low total T with normal Free T. No other hormonal work up. We discussed that at this point after being on T replacment for long time his pituitary access has been suppressed. He prefers to stay on it as his symptoms did improve on it. We reviewed again T side effects including polycythemia and association with CVD and prostate cancer.    Plan:  Due for labs in 3month: T level, cbc and PSA    2. Hypothyroidism  No previous records of abnormal labs. He has been on LT4 100 mcg daily since 2012. Most likely primary  hypothyroidism though no labs. Last TSH wnl 6/2022    Plan:  Yearly TFT  Continue LT4 100 mcg daily    Test and/or medications prescribed today:  Orders Placed This Encounter   Procedures     CBC with platelets     PSA tumor marker     Testosterone total         Follow up: 3 month      Deo Still MD  Endocrinology, Diabetes and Metabolism  [facility]

## 2022-12-05 DIAGNOSIS — R79.89 LOW TESTOSTERONE: ICD-10-CM

## 2022-12-06 NOTE — TELEPHONE ENCOUNTER
testosterone (ANDROGEL) 40.5 MG/2.5GM (1.62%) GEL 60 packet 3 8/11/2022         Last Written Prescription Date:  8-  Last Fill Quantity: 60,   # refills: 3  Last Office Visit : 9-  Future Office visit:  3-    Routing refill request to provider for review/approval because:  CONTROLLED MEDICATION      Kathleen M Doege RN

## 2022-12-08 ENCOUNTER — LAB (OUTPATIENT)
Dept: LAB | Facility: CLINIC | Age: 67
End: 2022-12-08
Payer: COMMERCIAL

## 2022-12-08 DIAGNOSIS — R79.89 LOW TESTOSTERONE: ICD-10-CM

## 2022-12-08 LAB
ERYTHROCYTE [DISTWIDTH] IN BLOOD BY AUTOMATED COUNT: 13 % (ref 10–15)
HCT VFR BLD AUTO: 49.9 % (ref 40–53)
HGB BLD-MCNC: 16.9 G/DL (ref 13.3–17.7)
MCH RBC QN AUTO: 30.3 PG (ref 26.5–33)
MCHC RBC AUTO-ENTMCNC: 33.9 G/DL (ref 31.5–36.5)
MCV RBC AUTO: 89 FL (ref 78–100)
PLATELET # BLD AUTO: 175 10E3/UL (ref 150–450)
PSA SERPL-MCNC: 3.7 NG/ML (ref 0–4.5)
RBC # BLD AUTO: 5.58 10E6/UL (ref 4.4–5.9)
WBC # BLD AUTO: 6.8 10E3/UL (ref 4–11)

## 2022-12-08 PROCEDURE — 84153 ASSAY OF PSA TOTAL: CPT | Performed by: PATHOLOGY

## 2022-12-08 PROCEDURE — 36415 COLL VENOUS BLD VENIPUNCTURE: CPT | Performed by: PATHOLOGY

## 2022-12-08 PROCEDURE — 85027 COMPLETE CBC AUTOMATED: CPT | Performed by: PATHOLOGY

## 2022-12-08 PROCEDURE — 84403 ASSAY OF TOTAL TESTOSTERONE: CPT | Performed by: STUDENT IN AN ORGANIZED HEALTH CARE EDUCATION/TRAINING PROGRAM

## 2022-12-08 NOTE — TELEPHONE ENCOUNTER
Androgen Agents Failed 12/06/2022 03:44 PM   Protocol Details  HCT less than 54% on file within past 12 mos    Refills for this classification require provider review

## 2022-12-15 LAB — TESTOST SERPL-MCNC: 302 NG/DL (ref 240–950)

## 2022-12-15 RX ORDER — TESTOSTERONE 40.5 MG/2.5G
81 GEL TOPICAL DAILY
Qty: 150 G | Refills: 1 | Status: SHIPPED | OUTPATIENT
Start: 2022-12-15 | End: 2023-02-17

## 2023-02-05 ENCOUNTER — HEALTH MAINTENANCE LETTER (OUTPATIENT)
Age: 68
End: 2023-02-05

## 2023-02-17 ENCOUNTER — TELEPHONE (OUTPATIENT)
Dept: ENDOCRINOLOGY | Facility: CLINIC | Age: 68
End: 2023-02-17
Payer: COMMERCIAL

## 2023-02-17 DIAGNOSIS — R79.89 LOW TESTOSTERONE: ICD-10-CM

## 2023-02-17 NOTE — TELEPHONE ENCOUNTER
Script was for 150 g so changed to 180 packets and routed to Dr Tessy Contreras RN on 2/17/2023 at 10:00 AM

## 2023-02-17 NOTE — TELEPHONE ENCOUNTER
[facility] Call Center    Phone Message    May a detailed message be left on voicemail: yes     Reason for Call: Medication Question or concern regarding medication   Prescription Clarification  Name of Medication:testosterone 40.5 MG/2.5GM (1.62%) GEL   Prescribing Provider: Dr Still   Pharmacy: [facility]   What on the order needs clarification? Received an alert that he has no more refills and his record states he should have one more remaining please advise patient          Action Taken: Other: endo    Travel Screening: Not Applicable

## 2023-02-20 RX ORDER — TESTOSTERONE 40.5 MG/2.5G
81 GEL TOPICAL DAILY
Qty: 180 PACKET | Refills: 1 | Status: SHIPPED | OUTPATIENT
Start: 2023-02-20 | End: 2023-05-18

## 2023-03-20 ENCOUNTER — TELEPHONE (OUTPATIENT)
Dept: ENDOCRINOLOGY | Facility: CLINIC | Age: 68
End: 2023-03-20
Payer: COMMERCIAL

## 2023-03-20 NOTE — TELEPHONE ENCOUNTER
Prior Authorization Not Needed per Insurance-2 packets/day is covered. Pharmacy is running claim for 450 packs/30 days!  Medication: testosterone 40.5 MG/2.5GM (1.62%) GEL-PA Not Needed  Insurance Company:    Expected CoPay:      Pharmacy Filling the Rx: [facility] - [place], [place] - [address]  Pharmacy Notified: Yes-I spoke to Marcia at pharmacy. She corrected their claim to run as 180 packs/90 days and it pays. She will contact patient.   Patient Notified: No

## 2023-05-18 ENCOUNTER — VIRTUAL VISIT (OUTPATIENT)
Dept: ENDOCRINOLOGY | Facility: CLINIC | Age: 68
End: 2023-05-18
Payer: COMMERCIAL

## 2023-05-18 DIAGNOSIS — R79.89 LOW TESTOSTERONE: ICD-10-CM

## 2023-05-18 PROCEDURE — 99214 OFFICE O/P EST MOD 30 MIN: CPT | Mod: VID | Performed by: STUDENT IN AN ORGANIZED HEALTH CARE EDUCATION/TRAINING PROGRAM

## 2023-05-18 RX ORDER — TESTOSTERONE 40.5 MG/2.5G
81 GEL TOPICAL DAILY
Qty: 180 PACKET | Refills: 1 | Status: SHIPPED | OUTPATIENT
Start: 2023-05-18 | End: 2023-11-20

## 2023-05-18 ASSESSMENT — ENCOUNTER SYMPTOMS
MUSCLE WEAKNESS: 0
POOR WOUND HEALING: 0
ARTHRALGIAS: 1
JOINT SWELLING: 1
SKIN CHANGES: 0
NECK PAIN: 0
STIFFNESS: 0
BACK PAIN: 1
MUSCLE CRAMPS: 0
NAIL CHANGES: 1
MYALGIAS: 0

## 2023-05-18 NOTE — NURSING NOTE
Is the patient currently in the state of [place]? YES    Visit mode:VIDEO    If the visit is dropped, the patient can be reconnected by: VIDEO VISIT: Text to cell phone: [phone]    Will anyone else be joining the visit? NO      How would you like to obtain your AVS? MyChart    Are changes needed to the allergy or medication list? NO    Reason for visit: Follow Up and Video Visit

## 2023-05-18 NOTE — PROGRESS NOTES
Endocrinology Clinic Visit     Video-Visit Details     Type of service:  Video Visit     Joined the call at 5/18/2023, 10:35:45 am.  Left the call at 5/18/2023, 10:49:01 am.     I spent a total of 30 minutes on the date of encounter reviewing medical records, evaluating the patient, coordinating care and documenting in the EHR, as detailed above.        Patient location home  Provider location off site    Platform used for Video Visit: dakota     NAME:  Miquel Jimenez  PCP:  Milton Culver  MRN:  [id number]  Reason for Consult:  Hypogonadism and hypothyroidism  Requesting Provider:  Referred Self    Chief Complaint     No chief complaint on file.      History of Present Illness     Miquel Jimenez is a 67 year old male who is seen in video visit for hypogonadism and hypothyroidism. Patient was following previously with Dr. Bridges. He established with me on 9/2022.    He was diagnosed with hypogonadism in 2012 by his PCP and has been on T replacement since then. He presented with low libido, low energy and feeling emotional. He looked at his previous labs ( scanned and attached to [employer]), his T level was 249 and his free was normal at 9. No gonadotropin and no other work up. When he established with Dr. Bridges prolactin was checked and was normal.     He was initially on depo-testosterone and then changed to Androgel. Symptoms improved shortly after medication started. Taking  40.5 mg of testosterone via gel packet once daily (1.62% concentration).      No history of  surgery.   No TBI. Reported previous MRI of brain years ago was done and was normal.     He has STAN and uses a CPAP. This was diagnosed around the same time as his hypogonadism.     He reported being diagnosed with hypothyroidism around the same time. The labs that he showed me back in 2012 had a normal TSH and no FT4 check. He reported being on lt4 at 100 mcg daily since diagnosis. He denied any symptoms of hypo or hyperthyroidism.      Interval hx:   He is doing great, he has no complaints or concerns.  No major changes in his meds.        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    Problem List     Hypothyroidism  Hypogonadism  STAN  HLD  HTN  Medications     Current Outpatient Medications   Medication     Aspirin 81 MG CAPS     atorvastatin (LIPITOR) 40 MG tablet     celecoxib (CELEBREX) 200 MG capsule     fish oil-omega-3 fatty acids 1000 MG capsule     levothyroxine (SYNTHROID/LEVOTHROID) 100 MCG tablet     lisinopril (ZESTRIL) 10 MG tablet     loratadine 10 MG capsule     sildenafil (VIAGRA) 100 MG tablet     testosterone 40.5 MG/2.5GM (1.62%) GEL     vitamin C (ASCORBIC ACID) 1000 MG TABS     zinc gluconate 50 MG tablet     No current facility-administered medications for this visit.        Allergies     No Known Allergies    Medical / Surgical History   Hypothyroidism  Hypogonadism  STAN  HLD  HTN    Social History     Social History     Socioeconomic History     Marital status:      Spouse name: Not on file     Number of children: Not on file     Years of education: Not on file     Highest education level: Not on file   Occupational History     Not on file   Tobacco Use     Smoking status: Never     Smokeless tobacco: Former     Quit date: 1980   Vaping Use     Vaping status: Not on file   Substance and Sexual Activity     Alcohol use: Not on file     Drug use: Not on file     Sexual activity: Not on file   Other Topics Concern     Not on file   Social History Narrative     Not on file     Social Determinants of Health     Financial Resource Strain: Not on file   Food Insecurity: Not on file   Transportation Needs: Not on file   Physical Activity: Not on file   Stress: Not on file   Social Connections: Not on file   Intimate Partner Violence: Not on file   Housing Stability: Not on file       Family History     Non contributory     ROS     12 ROS completed, pertinent positive and  negative in HPI    Physical Exam   There were no vitals taken for this visit.   GENERAL: Healthy, alert and no distress  EYES: Eyes grossly normal to inspection.  No discharge or erythema, or obvious scleral/conjunctival abnormalities.  RESP: No audible wheeze, cough, or visible cyanosis.  No visible retractions or increased work of breathing.    SKIN: Visible skin clear. No significant rash, abnormal pigmentation or lesions.  NEURO: Cranial nerves grossly intact.  Mentation and speech appropriate for age.  PSYCH: Mentation appears normal, affect normal/bright, judgement and insight intact, normal speech and appearance well-groomed.     Labs/Imaging     Pertinent Labs were reviewed and updated in EPIC and discussed briefly.  Radiology Results were  reviewed and updated in EPIC and discussed briefly.    Summary of recent findings:   No results found for: A1C    TSH   Date Value Ref Range Status   06/03/2022 1.25 0.30 - 5.00 uIU/mL Final   12/14/2021 1.36 0.40 - 4.00 mU/L Final   09/08/2021 0.69 0.40 - 4.00 mU/L Final   08/11/2020 1.02 0.30 - 5.00 uIU/mL Final       Creatinine   Date Value Ref Range Status   06/03/2022 0.76 0.70 - 1.30 mg/dL Final       Recent Labs   Lab Test 06/03/22  1128 10/19/21  0956   CHOL 179 191   HDL 41 40    115   TRIG 172* 178*       No results found for: [id number], [id number], [id number]    I personally reviewed the patient's outside records from [facility] EMR and scanned records. Summary of pertinent findings in [facility].    Impression / Plan     1. Hypogonadism  Long history on T replacement since 2012. Diagnosis was not confirmed at that time, he had slightly low total T with normal Free T. No other hormonal work up. We discussed that at this point after being on T replacment for long time his pituitary access has been suppressed. He prefers to stay on it as his symptoms did improve on it. We reviewed again T side effects including polycythemia and association with CVD and prostate  cancer.    PSA was trending up but not more than 1.5 increase and still <4. We discussed if any of these changes happen to his PSA we will plan for urology referral. He has no known BPH and no obstructive urinary sx.    Plan:  Due for labs: T level, cbc and PSA       2. Hypothyroidism  No previous records of abnormal labs. He has been on LT4 100 mcg daily since 2012. Most likely primary hypothyroidism though no labs. Last TSH wnl 6/2022    Plan:  Yearly TFT- due for labs  Continue LT4 100 mcg daily    Test and/or medications prescribed today:  No orders of the defined types were placed in this encounter.        Follow up: 6 month      Deo Still MD  Endocrinology, Diabetes and Metabolism  [facility]  Answers for HPI/ROS submitted by the patient on 5/18/2023  General Symptoms: No  Skin Symptoms: Yes  HENT Symptoms: No  EYE SYMPTOMS: No  HEART SYMPTOMS: No  LUNG SYMPTOMS: No  INTESTINAL SYMPTOMS: No  URINARY SYMPTOMS: No  REPRODUCTIVE SYMPTOMS: No  SKELETAL SYMPTOMS: Yes  BLOOD SYMPTOMS: No  NERVOUS SYSTEM SYMPTOMS: No  MENTAL HEALTH SYMPTOMS: No  Changes in hair: No  Changes in moles/birth marks: No  Itching: No  Rashes: No  Changes in nails: Yes  Acne: No  Change in facial hair: No  Warts: Yes  Non-healing sores: No  Scarring: No  Flaking of skin: No  Color changes of hands/feet in cold : No  Sun sensitivity: No  Skin thickening: No  Back pain: Yes  Muscle aches: No  Neck pain: No  Swollen joints: Yes  Joint pain: Yes  Bone pain: No  Muscle cramps: No  Muscle weakness: No  Joint stiffness: No  Bone fracture: No

## 2023-05-18 NOTE — LETTER
5/18/2023       RE: Miquel Jimenez  [address]  [facility] [address]     Dear Colleague,    Thank you for referring your patient, Miquel Jimenez, to the [facility] ENDOCRINOLOGY CLINIC [facility]. Please see a copy of my visit note below.    Endocrinology Clinic Visit     Video-Visit Details     Type of service:  Video Visit     Joined the call at 5/18/2023, 10:35:45 am.  Left the call at 5/18/2023, 10:49:01 am.     I spent a total of 30 minutes on the date of encounter reviewing medical records, evaluating the patient, coordinating care and documenting in the EHR, as detailed above.        Patient location home  Provider location off site    Platform used for Video Visit: [email or web address]     NAME:  Miquel Jimenez  PCP:  Milton Culver  MRN:  [id number]  Reason for Consult:  Hypogonadism and hypothyroidism  Requesting Provider:  Referred Self    Chief Complaint     No chief complaint on file.      History of Present Illness     Miquel Jimenez is a 67 year old male who is seen in video visit for hypogonadism and hypothyroidism. Patient was following previously with Dr. Bridges. He established with me on 9/2022.    He was diagnosed with hypogonadism in 2012 by his PCP and has been on T replacement since then. He presented with low libido, low energy and feeling emotional. He looked at his previous labs ( scanned and attached to [employer]), his T level was 249 and his free was normal at 9. No gonadotropin and no other work up. When he established with Dr. Bridges prolactin was checked and was normal.     He was initially on depo-testosterone and then changed to Androgel. Symptoms improved shortly after medication started. Taking  40.5 mg of testosterone via gel packet once daily (1.62% concentration).      No history of  surgery.   No TBI. Reported previous MRI of brain years ago was done and was normal.     He has STAN and uses a CPAP. This  was diagnosed around the same time as his hypogonadism.     He reported being diagnosed with hypothyroidism around the same time. The labs that he showed me back in 2012 had a normal TSH and no FT4 check. He reported being on lt4 at 100 mcg daily since diagnosis. He denied any symptoms of hypo or hyperthyroidism.     Interval hx:   He is doing great, he has no complaints or concerns.  No major changes in his meds.        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    Problem List     Hypothyroidism  Hypogonadism  STAN  HLD  HTN  Medications     Current Outpatient Medications   Medication    Aspirin 81 MG CAPS    atorvastatin (LIPITOR) 40 MG tablet    celecoxib (CELEBREX) 200 MG capsule    fish oil-omega-3 fatty acids 1000 MG capsule    levothyroxine (SYNTHROID/LEVOTHROID) 100 MCG tablet    lisinopril (ZESTRIL) 10 MG tablet    loratadine 10 MG capsule    sildenafil (VIAGRA) 100 MG tablet    testosterone 40.5 MG/2.5GM (1.62%) GEL    vitamin C (ASCORBIC ACID) 1000 MG TABS    zinc gluconate 50 MG tablet     No current facility-administered medications for this visit.        Allergies     No Known Allergies    Medical / Surgical History   Hypothyroidism  Hypogonadism  STAN  HLD  HTN    Social History     Social History     Socioeconomic History    Marital status:      Spouse name: Not on file    Number of children: Not on file    Years of education: Not on file    Highest education level: Not on file   Occupational History    Not on file   Tobacco Use    Smoking status: Never    Smokeless tobacco: Former     Quit date: 1980   Vaping Use    Vaping status: Not on file   Substance and Sexual Activity    Alcohol use: Not on file    Drug use: Not on file    Sexual activity: Not on file   Other Topics Concern    Not on file   Social History Narrative    Not on file     Social Determinants of Health     Financial Resource Strain: Not on file   Food Insecurity: Not on file    Transportation Needs: Not on file   Physical Activity: Not on file   Stress: Not on file   Social Connections: Not on file   Intimate Partner Violence: Not on file   Housing Stability: Not on file       Family History     Non contributory     ROS     12 ROS completed, pertinent positive and negative in HPI    Physical Exam   There were no vitals taken for this visit.   GENERAL: Healthy, alert and no distress  EYES: Eyes grossly normal to inspection.  No discharge or erythema, or obvious scleral/conjunctival abnormalities.  RESP: No audible wheeze, cough, or visible cyanosis.  No visible retractions or increased work of breathing.    SKIN: Visible skin clear. No significant rash, abnormal pigmentation or lesions.  NEURO: Cranial nerves grossly intact.  Mentation and speech appropriate for age.  PSYCH: Mentation appears normal, affect normal/bright, judgement and insight intact, normal speech and appearance well-groomed.     Labs/Imaging     Pertinent Labs were reviewed and updated in EPIC and discussed briefly.  Radiology Results were  reviewed and updated in EPIC and discussed briefly.    Summary of recent findings:   No results found for: A1C    TSH   Date Value Ref Range Status   06/03/2022 1.25 0.30 - 5.00 uIU/mL Final   12/14/2021 1.36 0.40 - 4.00 mU/L Final   09/08/2021 0.69 0.40 - 4.00 mU/L Final   08/11/2020 1.02 0.30 - 5.00 uIU/mL Final       Creatinine   Date Value Ref Range Status   06/03/2022 0.76 0.70 - 1.30 mg/dL Final       Recent Labs   Lab Test 06/03/22  1128 10/19/21  0956   CHOL 179 191   HDL 41 40    115   TRIG 172* 178*       No results found for: [id number], [id number], [id number]    I personally reviewed the patient's outside records from [facility] EMR and scanned records. Summary of pertinent findings in [facility].    Impression / Plan     1. Hypogonadism  Long history on T replacement since 2012. Diagnosis was not confirmed at that time, he had slightly low total T with normal Free T. No other  hormonal work up. We discussed that at this point after being on T replacment for long time his pituitary access has been suppressed. He prefers to stay on it as his symptoms did improve on it. We reviewed again T side effects including polycythemia and association with CVD and prostate cancer.    PSA was trending up but not more than 1.5 increase and still <4. We discussed if any of these changes happen to his PSA we will plan for urology referral. He has no known BPH and no obstructive urinary sx.    Plan:  Due for labs: T level, cbc and PSA       2. Hypothyroidism  No previous records of abnormal labs. He has been on LT4 100 mcg daily since 2012. Most likely primary hypothyroidism though no labs. Last TSH wnl 6/2022    Plan:  Yearly TFT- due for labs  Continue LT4 100 mcg daily    Test and/or medications prescribed today:  No orders of the defined types were placed in this encounter.        Follow up: 6 month      Deo Still MD  Endocrinology, Diabetes and Metabolism  [facility]  Answers for HPI/ROS submitted by the patient on 5/18/2023  General Symptoms: No  Skin Symptoms: Yes  HENT Symptoms: No  EYE SYMPTOMS: No  HEART SYMPTOMS: No  LUNG SYMPTOMS: No  INTESTINAL SYMPTOMS: No  URINARY SYMPTOMS: No  REPRODUCTIVE SYMPTOMS: No  SKELETAL SYMPTOMS: Yes  BLOOD SYMPTOMS: No  NERVOUS SYSTEM SYMPTOMS: No  MENTAL HEALTH SYMPTOMS: No  Changes in hair: No  Changes in moles/birth marks: No  Itching: No  Rashes: No  Changes in nails: Yes  Acne: No  Change in facial hair: No  Warts: Yes  Non-healing sores: No  Scarring: No  Flaking of skin: No  Color changes of hands/feet in cold : No  Sun sensitivity: No  Skin thickening: No  Back pain: Yes  Muscle aches: No  Neck pain: No  Swollen joints: Yes  Joint pain: Yes  Bone pain: No  Muscle cramps: No  Muscle weakness: No  Joint stiffness: No  Bone fracture: No

## 2023-06-05 ENCOUNTER — HOSPITAL ENCOUNTER (OUTPATIENT)
Facility: CLINIC | Age: 68
Discharge: HOME | End: 2023-06-05
Admitting: FAMILY MEDICINE
Payer: COMMERCIAL

## 2023-06-05 ENCOUNTER — LAB REQUISITION (OUTPATIENT)
Dept: LAB | Facility: CLINIC | Age: 68
End: 2023-06-05
Payer: COMMERCIAL

## 2023-06-05 DIAGNOSIS — Z12.5 ENCOUNTER FOR SCREENING FOR MALIGNANT NEOPLASM OF PROSTATE: ICD-10-CM

## 2023-06-05 DIAGNOSIS — B35.1 TINEA UNGUIUM: ICD-10-CM

## 2023-06-05 DIAGNOSIS — E03.9 HYPOTHYROIDISM, UNSPECIFIED: ICD-10-CM

## 2023-06-05 DIAGNOSIS — E78.2 MIXED HYPERLIPIDEMIA: ICD-10-CM

## 2023-06-05 LAB
ALBUMIN SERPL BCG-MCNC: 4.4 G/DL (ref 3.5–5.2)
ALP SERPL-CCNC: 91 U/L (ref 40–129)
ALT SERPL W P-5'-P-CCNC: 46 U/L (ref 10–50)
ANION GAP SERPL CALCULATED.3IONS-SCNC: 12 MMOL/L (ref 7–15)
AST SERPL W P-5'-P-CCNC: 32 U/L (ref 10–50)
BILIRUB SERPL-MCNC: 0.6 MG/DL
BUN SERPL-MCNC: 14.7 MG/DL (ref 8–23)
CALCIUM SERPL-MCNC: 10.3 MG/DL (ref 8.8–10.2)
CHLORIDE SERPL-SCNC: 104 MMOL/L (ref 98–107)
CHOLEST SERPL-MCNC: 178 MG/DL
CREAT SERPL-MCNC: 0.87 MG/DL (ref 0.67–1.17)
DEPRECATED HCO3 PLAS-SCNC: 24 MMOL/L (ref 22–29)
GFR SERPL CREATININE-BSD FRML MDRD: >90 ML/MIN/1.73M2
GLUCOSE SERPL-MCNC: 91 MG/DL (ref 70–99)
HDLC SERPL-MCNC: 38 MG/DL
LDLC SERPL CALC-MCNC: 102 MG/DL
NONHDLC SERPL-MCNC: 140 MG/DL
POTASSIUM SERPL-SCNC: 4.7 MMOL/L (ref 3.4–5.3)
PROT SERPL-MCNC: 6.7 G/DL (ref 6.4–8.3)
PSA SERPL DL<=0.01 NG/ML-MCNC: 3.39 NG/ML (ref 0–4.5)
SODIUM SERPL-SCNC: 140 MMOL/L (ref 136–145)
TRIGL SERPL-MCNC: 190 MG/DL
TSH SERPL DL<=0.005 MIU/L-ACNC: 1.69 UIU/ML (ref 0.3–4.2)

## 2023-06-05 PROCEDURE — 80053 COMPREHEN METABOLIC PANEL: CPT | Performed by: FAMILY MEDICINE

## 2023-06-05 PROCEDURE — 80061 LIPID PANEL: CPT | Mod: ORL | Performed by: FAMILY MEDICINE

## 2023-06-05 PROCEDURE — 84443 ASSAY THYROID STIM HORMONE: CPT | Mod: ORL | Performed by: FAMILY MEDICINE

## 2023-06-05 PROCEDURE — G0103 PSA SCREENING: HCPCS | Mod: ORL | Performed by: FAMILY MEDICINE

## 2023-06-15 DIAGNOSIS — N52.9 ERECTILE DYSFUNCTION, UNSPECIFIED ERECTILE DYSFUNCTION TYPE: ICD-10-CM

## 2023-06-15 RX ORDER — SILDENAFIL 100 MG/1
50-100 TABLET, FILM COATED ORAL DAILY PRN
Qty: 10 TABLET | Refills: 11 | Status: CANCELLED | OUTPATIENT
Start: 2023-06-15

## 2023-07-07 ENCOUNTER — LAB REQUISITION (OUTPATIENT)
Dept: LAB | Facility: CLINIC | Age: 68
End: 2023-07-07
Payer: COMMERCIAL

## 2023-07-07 ENCOUNTER — HOSPITAL ENCOUNTER (OUTPATIENT)
Facility: CLINIC | Age: 68
Discharge: HOME | End: 2023-07-07
Admitting: FAMILY MEDICINE
Payer: COMMERCIAL

## 2023-07-07 DIAGNOSIS — Z51.81 ENCOUNTER FOR THERAPEUTIC DRUG LEVEL MONITORING: ICD-10-CM

## 2023-07-07 LAB
ALBUMIN SERPL BCG-MCNC: 4.6 G/DL (ref 3.5–5.2)
ALP SERPL-CCNC: 89 U/L (ref 40–129)
ALT SERPL W P-5'-P-CCNC: 36 U/L (ref 0–70)
AST SERPL W P-5'-P-CCNC: 22 U/L (ref 0–45)
BILIRUB DIRECT SERPL-MCNC: <0.2 MG/DL (ref 0–0.3)
BILIRUB SERPL-MCNC: 0.4 MG/DL
PROT SERPL-MCNC: 6.7 G/DL (ref 6.4–8.3)

## 2023-07-07 PROCEDURE — 80076 HEPATIC FUNCTION PANEL: CPT | Mod: ORL | Performed by: FAMILY MEDICINE

## 2023-08-31 ENCOUNTER — LAB REQUISITION (OUTPATIENT)
Dept: LAB | Facility: CLINIC | Age: 68
End: 2023-08-31
Payer: COMMERCIAL

## 2023-08-31 ENCOUNTER — HOSPITAL ENCOUNTER (OUTPATIENT)
Facility: CLINIC | Age: 68
Discharge: HOME | End: 2023-08-31
Admitting: FAMILY MEDICINE
Payer: COMMERCIAL

## 2023-08-31 DIAGNOSIS — Z51.81 ENCOUNTER FOR THERAPEUTIC DRUG LEVEL MONITORING: ICD-10-CM

## 2023-08-31 LAB
ALBUMIN SERPL BCG-MCNC: 4.4 G/DL (ref 3.5–5.2)
ALP SERPL-CCNC: 86 U/L (ref 40–129)
ALT SERPL W P-5'-P-CCNC: 42 U/L (ref 0–70)
AST SERPL W P-5'-P-CCNC: 27 U/L (ref 0–45)
BILIRUB DIRECT SERPL-MCNC: <0.2 MG/DL (ref 0–0.3)
BILIRUB SERPL-MCNC: 0.4 MG/DL
PROT SERPL-MCNC: 6.5 G/DL (ref 6.4–8.3)

## 2023-08-31 PROCEDURE — 80076 HEPATIC FUNCTION PANEL: CPT | Mod: ORL | Performed by: FAMILY MEDICINE

## 2023-11-20 DIAGNOSIS — R79.89 LOW TESTOSTERONE: ICD-10-CM

## 2023-11-21 RX ORDER — TESTOSTERONE 40.5 MG/2.5G
81 GEL TOPICAL DAILY
Qty: 180 PACKET | Refills: 0 | Status: SHIPPED | OUTPATIENT
Start: 2023-11-21 | End: 2024-03-05

## 2023-11-21 NOTE — TELEPHONE ENCOUNTER
testosterone 40.5 MG/2.5GM (1.62%) GEL   Last Written Prescription Date:  5/18/2023  Last Fill Quantity: 180,   # refills: 1  Last Office Visit : 11/20/2023  Future Office visit:  None    Routing refill request to provider for review/approval because:  Drug not on the [facility], P or [facility] refill protocol or controlled substance    Yulia Freeman RN  Central Triage Red Flags/Med Refills

## 2023-11-28 ENCOUNTER — LAB (OUTPATIENT)
Dept: LAB | Facility: CLINIC | Age: 68
End: 2023-11-28
Payer: COMMERCIAL

## 2023-11-28 DIAGNOSIS — R79.89 LOW TESTOSTERONE: ICD-10-CM

## 2023-11-28 LAB
HCT VFR BLD AUTO: 52.9 % (ref 40–53)
PSA SERPL DL<=0.01 NG/ML-MCNC: 3.94 NG/ML (ref 0–4.5)
SHBG SERPL-SCNC: 17 NMOL/L (ref 11–80)
TSH SERPL DL<=0.005 MIU/L-ACNC: 1.29 UIU/ML (ref 0.3–4.2)

## 2023-11-28 PROCEDURE — 36415 COLL VENOUS BLD VENIPUNCTURE: CPT

## 2023-11-28 PROCEDURE — 84270 ASSAY OF SEX HORMONE GLOBUL: CPT

## 2023-11-28 PROCEDURE — 84403 ASSAY OF TOTAL TESTOSTERONE: CPT

## 2023-11-28 PROCEDURE — 84443 ASSAY THYROID STIM HORMONE: CPT

## 2023-11-28 PROCEDURE — 84153 ASSAY OF PSA TOTAL: CPT

## 2023-11-28 PROCEDURE — 85014 HEMATOCRIT: CPT

## 2023-11-30 LAB
TESTOST FREE SERPL-MCNC: 17.53 NG/DL
TESTOST SERPL-MCNC: 596 NG/DL (ref 240–950)

## 2023-12-05 ENCOUNTER — VIRTUAL VISIT (OUTPATIENT)
Dept: ENDOCRINOLOGY | Facility: CLINIC | Age: 68
End: 2023-12-05
Payer: COMMERCIAL

## 2023-12-05 DIAGNOSIS — E03.9 HYPOTHYROIDISM, UNSPECIFIED TYPE: ICD-10-CM

## 2023-12-05 DIAGNOSIS — R79.89 LOW TESTOSTERONE: Primary | ICD-10-CM

## 2023-12-05 PROCEDURE — 99214 OFFICE O/P EST MOD 30 MIN: CPT | Mod: VID | Performed by: STUDENT IN AN ORGANIZED HEALTH CARE EDUCATION/TRAINING PROGRAM

## 2023-12-05 ASSESSMENT — PAIN SCALES - GENERAL: PAINLEVEL: NO PAIN (0)

## 2023-12-05 NOTE — NURSING NOTE
Is the patient currently in the state of [place]? YES    Visit mode:VIDEO    If the visit is dropped, the patient can be reconnected by: VIDEO VISIT: Text to cell phone:   Telephone Information:   Mobile [phone]       Will anyone else be joining the visit? NO  (If patient encounters technical issues they should call [phone])    How would you like to obtain your AVS? MyChart    Are changes needed to the allergy or medication list? No    Reason for visit: RECHECK Shelby Kocher VVF

## 2023-12-05 NOTE — LETTER
12/5/2023       RE: Miquel Jimenez  [address]  [facility] [address]     Dear Colleague,    Thank you for referring your patient, Miquel Jimenez, to the [facility] ENDOCRINOLOGY CLINIC [facility]. Please see a copy of my visit note below.    Endocrinology Clinic Visit     Video-Visit Details     Type of service:  Video Visit     Joined the call at 12/5/2023, 1:36:17 pm.  Left the call at 12/5/2023, 1:50:49 pm.    I spent a total of 30 minutes on the date of encounter reviewing medical records, evaluating the patient, coordinating care and documenting in the EHR, as detailed above.        Patient location home  Provider location off site    Platform used for Video Visit: [email or web address]     NAME:  Miquel Jimenez  PCP:  Milton Culver  MRN:  [id number]  Reason for Consult:  Hypogonadism and hypothyroidism  Requesting Provider:  Referred Self    Chief Complaint     Chief Complaint   Patient presents with    RECHECK       History of Present Illness     Miquel Jimenez is a 67 year old male who is seen in video visit for hypogonadism and hypothyroidism. Patient was following previously with Dr. Bridges. He established with me on 9/2022.    He was diagnosed with hypogonadism in 2012 by his PCP and has been on T replacement since then. He presented with low libido, low energy and feeling emotional. He looked at his previous labs ( scanned and attached to [employer]), his T level was 249 and his free was normal at 9. No gonadotropin and no other work up. When he established with Dr. Bridges prolactin was checked and was normal.     He was initially on depo-testosterone and then changed to Androgel. Symptoms improved shortly after medication started. Taking  40.5 mg of testosterone via gel packet once daily (1.62% concentration).      No history of  surgery.   No TBI. Reported previous MRI of brain years ago was done and was normal.     He has STAN  and uses a CPAP. This was diagnosed around the same time as his hypogonadism.     Interval hx:   He is doing great, he has no complaints or concerns.  No major changes in his meds.   Most recent labs 11/28/2023 total testosterone 596, hematocrit 52.9 and PSA 3.94.  He denies any symptoms of BPH.    # hypothyroidism     He reported being diagnosed with hypothyroidism around the same time. The labs that he showed me back in 2012 had a normal TSH and no FT4 check. He reported being on lt4 at 100 mcg daily since diagnosis. He denied any symptoms of hypo or hyperthyroidism.   Last TSH 11/28/23: WNL AT 1.29.           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    Problem List     Hypothyroidism  Hypogonadism  STAN  HLD  HTN  Medications     Current Outpatient Medications   Medication    Aspirin 81 MG CAPS    atorvastatin (LIPITOR) 40 MG tablet    celecoxib (CELEBREX) 200 MG capsule    fish oil-omega-3 fatty acids 1000 MG capsule    levothyroxine (SYNTHROID/LEVOTHROID) 100 MCG tablet    lisinopril (ZESTRIL) 10 MG tablet    loratadine 10 MG capsule    sildenafil (VIAGRA) 100 MG tablet    testosterone 40.5 MG/2.5GM (1.62%) GEL    vitamin C (ASCORBIC ACID) 1000 MG TABS    zinc gluconate 50 MG tablet     No current facility-administered medications for this visit.        Allergies     No Known Allergies    Medical / Surgical History   Hypothyroidism  Hypogonadism  STAN  HLD  HTN    Social History     Social History     Socioeconomic History    Marital status:      Spouse name: Not on file    Number of children: Not on file    Years of education: Not on file    Highest education level: Not on file   Occupational History    Not on file   Tobacco Use    Smoking status: Never    Smokeless tobacco: Former     Quit date: 1980   Substance and Sexual Activity    Alcohol use: Not on file    Drug use: Not on file    Sexual activity: Not on file   Other Topics Concern    Not on file  "  Social History Narrative    Not on file     Social Determinants of Health     Financial Resource Strain: Not on file   Food Insecurity: Not on file   Transportation Needs: Not on file   Physical Activity: Not on file   Stress: Not on file   Social Connections: Not on file   Interpersonal Safety: Not on file   Housing Stability: Not on file       Family History     Non contributory     ROS     12 ROS completed, pertinent positive and negative in HPI    Physical Exam   There were no vitals taken for this visit.   GENERAL: Healthy, alert and no distress  EYES: Eyes grossly normal to inspection.  No discharge or erythema, or obvious scleral/conjunctival abnormalities.  RESP: No audible wheeze, cough, or visible cyanosis.  No visible retractions or increased work of breathing.    SKIN: Visible skin clear. No significant rash, abnormal pigmentation or lesions.  NEURO: Cranial nerves grossly intact.  Mentation and speech appropriate for age.  PSYCH: Mentation appears normal, affect normal/bright, judgement and insight intact, normal speech and appearance well-groomed.     Labs/Imaging     Pertinent Labs were reviewed and updated in EPIC and discussed briefly.  Radiology Results were  reviewed and updated in EPIC and discussed briefly.    Summary of recent findings:   No results found for: A1C    TSH   Date Value Ref Range Status   11/28/2023 1.29 0.30 - 4.20 uIU/mL Final   06/05/2023 1.69 0.30 - 4.20 uIU/mL Final   06/03/2022 1.25 0.30 - 5.00 uIU/mL Final   12/14/2021 1.36 0.40 - 4.00 mU/L Final   09/08/2021 0.69 0.40 - 4.00 mU/L Final   08/11/2020 1.02 0.30 - 5.00 uIU/mL Final       Creatinine   Date Value Ref Range Status   06/05/2023 0.87 0.67 - 1.17 mg/dL Final       Recent Labs   Lab Test 06/03/22  1128 10/19/21  0956   CHOL 179 191   HDL 41 40    115   TRIG 172* 178*       No results found for: \"[id number]\", \"[id number]\", \"[id number]\"    I personally reviewed the patient's outside records from [employer] EMR and " scanned records. Summary of pertinent findings in HPI.    Impression / Plan     1. Hypogonadism  Long history on T replacement since 2012. Diagnosis was not confirmed at that time, he had slightly low total T with normal Free T. No other hormonal work up. We discussed that at this point after being on T replacment for long time his pituitary access has been suppressed. He prefers to stay on it as his symptoms did improve on it. We reviewed during our initial visit T side effects including polycythemia and association with CVD and prostate cancer.    PSA was trending up but not more than 1.5 increase and still <4.  Most recent PSA is up to 3.94.  We discussed if any of these changes happen to his PSA we will plan for urology referral. He has no known BPH and no obstructive urinary sx. we will plan on rechecking levels in 3-month    Plan:  Labs in 3-month : T level, hematocrit and PSA       2. Hypothyroidism  No previous records of abnormal labs. He has been on LT4 100 mcg daily since 2012. Most likely primary hypothyroidism though no labs. Last TSH wnl November 2023    Plan:  Yearly TFT   Continue LT4 100 mcg daily    3.  Mild intermittent hypercalcemia  He had labs with intermittent mild hypercalcemia alternating with normal levels, most recent calcium checked 6/5/2023 was 10.3.  Labs on 12/14/2021 with calcium of 10, vitamin D 25 and PTH 58.  Old labs in 2021 calcium 10.5 and PTH 96.   We will discuss further next visit.    Test and/or medications prescribed today:  No orders of the defined types were placed in this encounter.        Follow up: 6 month      Deo Still MD  Endocrinology, Diabetes and Metabolism  [facility]

## 2023-12-05 NOTE — PROGRESS NOTES
Endocrinology Clinic Visit     Video-Visit Details     Type of service:  Video Visit     Joined the call at 12/5/2023, 1:36:17 pm.  Left the call at 12/5/2023, 1:50:49 pm.    I spent a total of 30 minutes on the date of encounter reviewing medical records, evaluating the patient, coordinating care and documenting in the EHR, as detailed above.        Patient location home  Provider location off site    Platform used for Video Visit: [email or web address]     NAME:  Miquel Jimenez  PCP:  Milton Culver Dre  MRN:  [id number]  Reason for Consult:  Hypogonadism and hypothyroidism  Requesting Provider:  Referred Self    Chief Complaint     Chief Complaint   Patient presents with    RECHECK       History of Present Illness     Miquel Jimenez is a 67 year old male who is seen in video visit for hypogonadism and hypothyroidism. Patient was following previously with Dr. Bridges. He established with me on 9/2022.    He was diagnosed with hypogonadism in 2012 by his PCP and has been on T replacement since then. He presented with low libido, low energy and feeling emotional. He looked at his previous labs ( scanned and attached to [employer]), his T level was 249 and his free was normal at 9. No gonadotropin and no other work up. When he established with Dr. Bridges prolactin was checked and was normal.     He was initially on depo-testosterone and then changed to Androgel. Symptoms improved shortly after medication started. Taking  40.5 mg of testosterone via gel packet once daily (1.62% concentration).      No history of  surgery.   No TBI. Reported previous MRI of brain years ago was done and was normal.     He has STAN and uses a CPAP. This was diagnosed around the same time as his hypogonadism.     Interval hx:   He is doing great, he has no complaints or concerns.  No major changes in his meds.   Most recent labs 11/28/2023 total testosterone 596, hematocrit 52.9 and PSA 3.94.  He denies any symptoms of BPH.    # hypothyroidism      He reported being diagnosed with hypothyroidism around the same time. The labs that he showed me back in 2012 had a normal TSH and no FT4 check. He reported being on lt4 at 100 mcg daily since diagnosis. He denied any symptoms of hypo or hyperthyroidism.   Last TSH 11/28/23: WNL AT 1.29.           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    Problem List     Hypothyroidism  Hypogonadism  STAN  HLD  HTN  Medications     Current Outpatient Medications   Medication    Aspirin 81 MG CAPS    atorvastatin (LIPITOR) 40 MG tablet    celecoxib (CELEBREX) 200 MG capsule    fish oil-omega-3 fatty acids 1000 MG capsule    levothyroxine (SYNTHROID/LEVOTHROID) 100 MCG tablet    lisinopril (ZESTRIL) 10 MG tablet    loratadine 10 MG capsule    sildenafil (VIAGRA) 100 MG tablet    testosterone 40.5 MG/2.5GM (1.62%) GEL    vitamin C (ASCORBIC ACID) 1000 MG TABS    zinc gluconate 50 MG tablet     No current facility-administered medications for this visit.        Allergies     No Known Allergies    Medical / Surgical History   Hypothyroidism  Hypogonadism  STAN  HLD  HTN    Social History     Social History     Socioeconomic History    Marital status:      Spouse name: Not on file    Number of children: Not on file    Years of education: Not on file    Highest education level: Not on file   Occupational History    Not on file   Tobacco Use    Smoking status: Never    Smokeless tobacco: Former     Quit date: 1980   Substance and Sexual Activity    Alcohol use: Not on file    Drug use: Not on file    Sexual activity: Not on file   Other Topics Concern    Not on file   Social History Narrative    Not on file     Social Determinants of Health     Financial Resource Strain: Not on file   Food Insecurity: Not on file   Transportation Needs: Not on file   Physical Activity: Not on file   Stress: Not on file   Social Connections: Not on file   Interpersonal Safety: Not on file   Housing  "Stability: Not on file       Family History     Non contributory     ROS     12 ROS completed, pertinent positive and negative in HPI    Physical Exam   There were no vitals taken for this visit.   GENERAL: Healthy, alert and no distress  EYES: Eyes grossly normal to inspection.  No discharge or erythema, or obvious scleral/conjunctival abnormalities.  RESP: No audible wheeze, cough, or visible cyanosis.  No visible retractions or increased work of breathing.    SKIN: Visible skin clear. No significant rash, abnormal pigmentation or lesions.  NEURO: Cranial nerves grossly intact.  Mentation and speech appropriate for age.  PSYCH: Mentation appears normal, affect normal/bright, judgement and insight intact, normal speech and appearance well-groomed.     Labs/Imaging     Pertinent Labs were reviewed and updated in EPIC and discussed briefly.  Radiology Results were  reviewed and updated in EPIC and discussed briefly.    Summary of recent findings:   No results found for: A1C    TSH   Date Value Ref Range Status   11/28/2023 1.29 0.30 - 4.20 uIU/mL Final   06/05/2023 1.69 0.30 - 4.20 uIU/mL Final   06/03/2022 1.25 0.30 - 5.00 uIU/mL Final   12/14/2021 1.36 0.40 - 4.00 mU/L Final   09/08/2021 0.69 0.40 - 4.00 mU/L Final   08/11/2020 1.02 0.30 - 5.00 uIU/mL Final       Creatinine   Date Value Ref Range Status   06/05/2023 0.87 0.67 - 1.17 mg/dL Final       Recent Labs   Lab Test 06/03/22  1128 10/19/21  0956   CHOL 179 191   HDL 41 40    115   TRIG 172* 178*       No results found for: \"[id number]\", \"[id number]\", \"[id number]\"    I personally reviewed the patient's outside records from [employer] EMR and scanned records. Summary of pertinent findings in [facility].    Impression / Plan     1. Hypogonadism  Long history on T replacement since 2012. Diagnosis was not confirmed at that time, he had slightly low total T with normal Free T. No other hormonal work up. We discussed that at this point after being on T replacment for " long time his pituitary access has been suppressed. He prefers to stay on it as his symptoms did improve on it. We reviewed during our initial visit T side effects including polycythemia and association with CVD and prostate cancer.    PSA was trending up but not more than 1.5 increase and still <4.  Most recent PSA is up to 3.94.  We discussed if any of these changes happen to his PSA we will plan for urology referral. He has no known BPH and no obstructive urinary sx. we will plan on rechecking levels in 3-month    Plan:  Labs in 3-month : T level, hematocrit and PSA       2. Hypothyroidism  No previous records of abnormal labs. He has been on LT4 100 mcg daily since 2012. Most likely primary hypothyroidism though no labs. Last TSH wnl November 2023    Plan:  Yearly TFT   Continue LT4 100 mcg daily    3.  Mild intermittent hypercalcemia  He had labs with intermittent mild hypercalcemia alternating with normal levels, most recent calcium checked 6/5/2023 was 10.3.  Labs on 12/14/2021 with calcium of 10, vitamin D 25 and PTH 58.  Old labs in 2021 calcium 10.5 and PTH 96.   We will discuss further next visit.    Test and/or medications prescribed today:  No orders of the defined types were placed in this encounter.        Follow up: 6 month      Deo Still MD  Endocrinology, Diabetes and Metabolism  [facility]

## 2023-12-11 ENCOUNTER — TELEPHONE (OUTPATIENT)
Dept: ENDOCRINOLOGY | Facility: CLINIC | Age: 68
End: 2023-12-11
Payer: COMMERCIAL

## 2024-03-03 ENCOUNTER — HEALTH MAINTENANCE LETTER (OUTPATIENT)
Age: 69
End: 2024-03-03

## 2024-03-05 DIAGNOSIS — R79.89 LOW TESTOSTERONE: ICD-10-CM

## 2024-03-05 RX ORDER — TESTOSTERONE 40.5 MG/2.5G
81 GEL TOPICAL DAILY
Qty: 180 PACKET | Refills: 0 | Status: SHIPPED | OUTPATIENT
Start: 2024-03-05 | End: 2024-03-22

## 2024-03-05 NOTE — TELEPHONE ENCOUNTER
testosterone 40.5 MG/2.5GM (1.62%) GEL   180 packet 0 11/21/2023 12/5/2023  [facility] Endocrinology [facility] [place]    Deo Still MD  Endocrinology, Diabetes, and Metabolism    routed because: controlled

## 2024-03-18 ENCOUNTER — LAB (OUTPATIENT)
Dept: LAB | Facility: CLINIC | Age: 69
End: 2024-03-18
Payer: COMMERCIAL

## 2024-03-18 DIAGNOSIS — R79.89 LOW TESTOSTERONE: ICD-10-CM

## 2024-03-18 LAB
HCT VFR BLD AUTO: 55.7 % (ref 40–53)
PSA SERPL DL<=0.01 NG/ML-MCNC: 4.01 NG/ML (ref 0–4.5)
SHBG SERPL-SCNC: 18 NMOL/L (ref 11–80)

## 2024-03-18 PROCEDURE — 36415 COLL VENOUS BLD VENIPUNCTURE: CPT

## 2024-03-18 PROCEDURE — 85014 HEMATOCRIT: CPT

## 2024-03-18 PROCEDURE — 84153 ASSAY OF PSA TOTAL: CPT | Mod: GA

## 2024-03-18 PROCEDURE — 84403 ASSAY OF TOTAL TESTOSTERONE: CPT

## 2024-03-18 PROCEDURE — 84270 ASSAY OF SEX HORMONE GLOBUL: CPT

## 2024-03-20 LAB
TESTOST FREE SERPL-MCNC: 10.76 NG/DL
TESTOST SERPL-MCNC: 393 NG/DL (ref 240–950)

## 2024-03-22 DIAGNOSIS — R79.89 LOW TESTOSTERONE: ICD-10-CM

## 2024-03-22 RX ORDER — TESTOSTERONE 40.5 MG/2.5G
40.5 GEL TOPICAL DAILY
Status: SHIPPED
Start: 2024-03-22 | End: 2024-07-02

## 2024-06-27 ENCOUNTER — LAB (OUTPATIENT)
Dept: LAB | Facility: CLINIC | Age: 69
End: 2024-06-27
Payer: COMMERCIAL

## 2024-06-27 DIAGNOSIS — R79.89 LOW TESTOSTERONE: ICD-10-CM

## 2024-06-27 LAB
HCT VFR BLD AUTO: 50.1 % (ref 40–53)
SHBG SERPL-SCNC: 22 NMOL/L (ref 11–80)

## 2024-06-27 PROCEDURE — 84403 ASSAY OF TOTAL TESTOSTERONE: CPT

## 2024-06-27 PROCEDURE — 84270 ASSAY OF SEX HORMONE GLOBUL: CPT

## 2024-06-27 PROCEDURE — 85014 HEMATOCRIT: CPT

## 2024-06-27 PROCEDURE — 36415 COLL VENOUS BLD VENIPUNCTURE: CPT

## 2024-06-30 LAB
TESTOST FREE SERPL-MCNC: 6.92 NG/DL
TESTOST SERPL-MCNC: 285 NG/DL (ref 240–950)

## 2024-07-02 ENCOUNTER — VIRTUAL VISIT (OUTPATIENT)
Dept: ENDOCRINOLOGY | Facility: CLINIC | Age: 69
End: 2024-07-02
Payer: COMMERCIAL

## 2024-07-02 DIAGNOSIS — R97.20 ELEVATED PSA: ICD-10-CM

## 2024-07-02 DIAGNOSIS — E03.9 HYPOTHYROIDISM, UNSPECIFIED TYPE: Primary | ICD-10-CM

## 2024-07-02 DIAGNOSIS — E66.9 OBESITY, UNSPECIFIED CLASSIFICATION, UNSPECIFIED OBESITY TYPE, UNSPECIFIED WHETHER SERIOUS COMORBIDITY PRESENT: ICD-10-CM

## 2024-07-02 DIAGNOSIS — N52.9 ERECTILE DYSFUNCTION, UNSPECIFIED ERECTILE DYSFUNCTION TYPE: ICD-10-CM

## 2024-07-02 DIAGNOSIS — R79.89 LOW TESTOSTERONE: ICD-10-CM

## 2024-07-02 PROCEDURE — 99215 OFFICE O/P EST HI 40 MIN: CPT | Mod: 95 | Performed by: STUDENT IN AN ORGANIZED HEALTH CARE EDUCATION/TRAINING PROGRAM

## 2024-07-02 PROCEDURE — G2211 COMPLEX E/M VISIT ADD ON: HCPCS | Mod: 95 | Performed by: STUDENT IN AN ORGANIZED HEALTH CARE EDUCATION/TRAINING PROGRAM

## 2024-07-02 RX ORDER — SILDENAFIL 100 MG/1
50-100 TABLET, FILM COATED ORAL DAILY PRN
Qty: 10 TABLET | Refills: 11 | Status: SHIPPED | OUTPATIENT
Start: 2024-07-02 | End: 2024-10-04

## 2024-07-02 RX ORDER — TESTOSTERONE 40.5 MG/2.5G
81 GEL TOPICAL DAILY
Qty: 90 PACKET | Refills: 1 | Status: SHIPPED | OUTPATIENT
Start: 2024-07-02 | End: 2024-07-29

## 2024-07-02 NOTE — LETTER
7/2/2024       RE: Miquel Jimenez  [address]  [facility] [address]     Dear Colleague,    Thank you for referring your patient, Miquel Jimenez, to the [facility] ENDOCRINOLOGY CLINIC [facility]. Please see a copy of my visit note below.    Endocrinology Clinic Visit     Video-Visit Details     Type of service:  Video Visit   Joined the call at 7/2/2024, 11:10:09 am.  Left the call at 7/2/2024, 11:25:35 am.    I spent a total of 40 minutes on the date of encounter reviewing medical records, evaluating the patient, coordinating care and documenting in the EHR, as detailed above.        Patient location home  Provider location off site    Platform used for Video Visit: dakota     NAME:  Miquel Jimenez  PCP:  Milton Culver  MRN:  [id number]  Reason for Consult:  Hypogonadism and hypothyroidism  Requesting Provider:  Referred Self    Chief Complaint     Chief Complaint   Patient presents with    RECHECK       History of Present Illness     Miquel Jimenez is a 67 year old male who is seen in video visit for hypogonadism and hypothyroidism. Patient was following previously with Dr. Bridges. He established with me on 9/2022. Last seen 12/2023    He was diagnosed with hypogonadism in 2012 by his PCP and has been on T replacement since then. He presented with low libido, low energy and feeling emotional. He looked at his previous labs ( scanned and attached to [employer]), his T level was 249 and his free was normal at 9. No gonadotropin and no other work up. When he established with Dr. Bridges prolactin was checked and was normal.     He was initially on depo-testosterone and then changed to Androgel. Symptoms improved shortly after medication started. Taking  40.5 mg of testosterone via gel 2 packets once daily (1.62% concentration).      No history of  surgery.   No TBI. Reported previous MRI of brain years ago was done and was  normal.     He has STAN and uses a CPAP. This was diagnosed around the same time as his hypogonadism.     Interval hx:   He is doing great, he has no complaints or concerns.  No urinary sx. PSA at the upper limit , remained stable.  On 3/2024 his hematocrit was up to 55.7  The dose of androgel was cut down to 1 pack daily  He cut back for 3 weeks but then started doing alternating 1 pack and 2 pack EOD.  Most recent labs    Latest Reference Range & Units 06/27/24 15:05   Free Testosterone Calculated ng/dL 6.92   Testosterone Total 240 - 950 ng/dL 285   Hematocrit 40.0 - 53.0 % 50.1       # hypothyroidism     He reported being diagnosed with hypothyroidism around the same time. The labs that he showed me back in 2012 had a normal TSH and no FT4 check. He reported being on lt4 at 100 mcg daily since diagnosis. He denied any symptoms of hypo or hyperthyroidism.   Last TSH 11/28/23: WNL AT 1.29.           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    Problem List     Hypothyroidism  Hypogonadism  STAN  HLD  HTN  Medications     Current Outpatient Medications   Medication Sig Dispense Refill    sildenafil (VIAGRA) 100 MG tablet Take 0.5-1 tablets ( mg) by mouth daily as needed (sexual activity) 10 tablet 11    testosterone 40.5 MG/2.5GM (1.62%) GEL Place 2 packets (81 mg) onto the skin daily Take 1 packet alternating with 2 packets every other day 90 packet 1    Aspirin 81 MG CAPS 81mg tab      atorvastatin (LIPITOR) 40 MG tablet       celecoxib (CELEBREX) 200 MG capsule Take 200 mg by mouth daily      fish oil-omega-3 fatty acids 1000 MG capsule       levothyroxine (SYNTHROID/LEVOTHROID) 100 MCG tablet 1 tab(s)      lisinopril (ZESTRIL) 10 MG tablet 1      loratadine 10 MG capsule 10mg tab      vitamin C (ASCORBIC ACID) 1000 MG TABS       zinc gluconate 50 MG tablet Take 50 mg by mouth daily       No current facility-administered medications for this visit.         Allergies     No Known Allergies    Medical / Surgical History   Hypothyroidism  Hypogonadism  STAN  HLD  HTN    Social History     Social History     Socioeconomic History    Marital status:      Spouse name: Not on file    Number of children: Not on file    Years of education: Not on file    Highest education level: Not on file   Occupational History    Not on file   Tobacco Use    Smoking status: Never    Smokeless tobacco: Former     Quit date: 1980   Substance and Sexual Activity    Alcohol use: Not on file    Drug use: Not on file    Sexual activity: Not on file   Other Topics Concern    Not on file   Social History Narrative    Not on file     Social Determinants of Health     Financial Resource Strain: Not on file   Food Insecurity: Not on file   Transportation Needs: Not on file   Physical Activity: Not on file   Stress: Not on file   Social Connections: Not on file   Interpersonal Safety: Not on file   Housing Stability: Not on file       Family History     Non contributory     ROS     12 ROS completed, pertinent positive and negative in HPI    Physical Exam   There were no vitals taken for this visit.   GENERAL: Healthy, alert and no distress  EYES: Eyes grossly normal to inspection.  No discharge or erythema, or obvious scleral/conjunctival abnormalities.  RESP: No audible wheeze, cough, or visible cyanosis.  No visible retractions or increased work of breathing.    SKIN: Visible skin clear. No significant rash, abnormal pigmentation or lesions.  NEURO: Cranial nerves grossly intact.  Mentation and speech appropriate for age.  PSYCH: Mentation appears normal, affect normal/bright, judgement and insight intact, normal speech and appearance well-groomed.     Labs/Imaging     Pertinent Labs were reviewed and updated in EPIC and discussed briefly.  Radiology Results were  reviewed and updated in EPIC and discussed briefly.    Summary of recent findings:   No results found for: A1C    TSH   Date  "Value Ref Range Status   11/28/2023 1.29 0.30 - 4.20 uIU/mL Final   06/05/2023 1.69 0.30 - 4.20 uIU/mL Final   06/03/2022 1.25 0.30 - 5.00 uIU/mL Final   12/14/2021 1.36 0.40 - 4.00 mU/L Final   09/08/2021 0.69 0.40 - 4.00 mU/L Final   08/11/2020 1.02 0.30 - 5.00 uIU/mL Final       Creatinine   Date Value Ref Range Status   06/05/2023 0.87 0.67 - 1.17 mg/dL Final       Recent Labs   Lab Test 06/03/22  1128 10/19/21  0956   CHOL 179 191   HDL 41 40    115   TRIG 172* 178*       No results found for: \"NNWK94PXDVI\", \"[id number]\", \"[id number]\"    I personally reviewed the patient's outside records from [facility] EMR and scanned records. Summary of pertinent findings in HPI.    Impression / Plan     1. Hypogonadism  Long history on T replacement since 2012. Diagnosis was not confirmed at that time, he had slightly low total T with normal Free T. No other hormonal work up. We discussed that at this point after being on T replacment for long time his pituitary access has been suppressed. He prefers to stay on it as his symptoms did improve on it. We reviewed during our initial visit T side effects including polycythemia and association with CVD and prostate cancer.    His hematocrit trended up to 55 on 3/2024. We decreased his T from 2 pack daily to 1 pack daily. He has been alternating 1 and 2 pack EOD. His most recent labs 6/27/24 hematocrit down to 50.     PSA was trending up but not more than 1.5 increase and still <4.  We discussed if any of these changes happen to his PSA we will plan for urology referral. He has no known BPH and no obstructive urinary sx. we will plan on rechecking levels in 3-month    Plan:  continue Transdermal T 1 pack alternating with 2 pack EOD.  Labs in 6month : T level, hematocrit and PSA       2. Hypothyroidism  No previous records of abnormal labs. He has been on LT4 100 mcg daily since 2012. Most likely primary hypothyroidism though no labs. Last TSH wnl November 2023    Plan:  Yearly " TFT   Continue LT4 100 mcg daily    3.  Mild intermittent hypercalcemia  He had labs with intermittent mild hypercalcemia alternating with normal levels, most recent calcium checked 6/5/2023 was 10.3.  Labs on 12/14/2021 with calcium of 10, vitamin D 25 and PTH 58.  Old labs in 2021 calcium 10.5 and PTH 96. No calcium check since 6/2023. He has upcoming visit with PCP at a local clinic. He will repeat labs and get back to me. Based on results we may decided on further work up.    4. Obesity, unsure of BMI  No recent wt and ht. He is interested in wt loss with lifestyle changes initially. Referral placed.     Test and/or medications prescribed today:  Orders Placed This Encounter   Procedures    TSH with free T4 reflex    Calcium    Albumin level    PSA tumor marker    Hematocrit    Adult Comprehensive Weight Management  Referral    Testosterone Free and Total         Follow up: 1 year  The longitudinal plan of care for the diagnosis(es)/condition(s) as documented were addressed during this visit. Due to the added complexity in care, I will continue to support Bill in the subsequent management and with ongoing continuity of care.      Deo Still MD  Endocrinology, Diabetes and Metabolism  [facility]

## 2024-07-02 NOTE — PROGRESS NOTES
Endocrinology Clinic Visit     Video-Visit Details     Type of service:  Video Visit   Joined the call at 7/2/2024, 11:10:09 am.  Left the call at 7/2/2024, 11:25:35 am.    I spent a total of 40 minutes on the date of encounter reviewing medical records, evaluating the patient, coordinating care and documenting in the EHR, as detailed above.        Patient location home  Provider location off site    Platform used for Video Visit: dakota     NAME:  Miquel Jimenez  PCP:  Milton Culver Dre  MRN:  [id number]  Reason for Consult:  Hypogonadism and hypothyroidism  Requesting Provider:  Referred Self    Chief Complaint     Chief Complaint   Patient presents with    RECHECK       History of Present Illness     Miquel Jimenez is a 67 year old male who is seen in video visit for hypogonadism and hypothyroidism. Patient was following previously with Dr. Bridges. He established with me on 9/2022. Last seen 12/2023    He was diagnosed with hypogonadism in 2012 by his PCP and has been on T replacement since then. He presented with low libido, low energy and feeling emotional. He looked at his previous labs ( scanned and attached to [employer] his T level was 249 and his free was normal at 9. No gonadotropin and no other work up. When he established with Dr. Bridges prolactin was checked and was normal.     He was initially on depo-testosterone and then changed to Androgel. Symptoms improved shortly after medication started. Taking  40.5 mg of testosterone via gel 2 packets once daily (1.62% concentration).      No history of  surgery.   No TBI. Reported previous MRI of brain years ago was done and was normal.     He has STAN and uses a CPAP. This was diagnosed around the same time as his hypogonadism.     Interval hx:   He is doing great, he has no complaints or concerns.  No urinary sx. PSA at the upper limit , remained stable.  On 3/2024 his hematocrit was up to 55.7  The dose of androgel was cut down to 1 pack  daily  He cut back for 3 weeks but then started doing alternating 1 pack and 2 pack EOD.  Most recent labs    Latest Reference Range & Units 06/27/24 15:05   Free Testosterone Calculated ng/dL 6.92   Testosterone Total 240 - 950 ng/dL 285   Hematocrit 40.0 - 53.0 % 50.1       # hypothyroidism     He reported being diagnosed with hypothyroidism around the same time. The labs that he showed me back in 2012 had a normal TSH and no FT4 check. He reported being on lt4 at 100 mcg daily since diagnosis. He denied any symptoms of hypo or hyperthyroidism.   Last TSH 11/28/23: WNL AT 1.29.           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    Problem List     Hypothyroidism  Hypogonadism  STAN  HLD  HTN  Medications     Current Outpatient Medications   Medication Sig Dispense Refill    sildenafil (VIAGRA) 100 MG tablet Take 0.5-1 tablets ( mg) by mouth daily as needed (sexual activity) 10 tablet 11    testosterone 40.5 MG/2.5GM (1.62%) GEL Place 2 packets (81 mg) onto the skin daily Take 1 packet alternating with 2 packets every other day 90 packet 1    Aspirin 81 MG CAPS 81mg tab      atorvastatin (LIPITOR) 40 MG tablet       celecoxib (CELEBREX) 200 MG capsule Take 200 mg by mouth daily      fish oil-omega-3 fatty acids 1000 MG capsule       levothyroxine (SYNTHROID/LEVOTHROID) 100 MCG tablet 1 tab(s)      lisinopril (ZESTRIL) 10 MG tablet 1      loratadine 10 MG capsule 10mg tab      vitamin C (ASCORBIC ACID) 1000 MG TABS       zinc gluconate 50 MG tablet Take 50 mg by mouth daily       No current facility-administered medications for this visit.        Allergies     No Known Allergies    Medical / Surgical History   Hypothyroidism  Hypogonadism  STAN  HLD  HTN    Social History     Social History     Socioeconomic History    Marital status:      Spouse name: Not on file    Number of children: Not on file    Years of education: Not on file    Highest education  level: Not on file   Occupational History    Not on file   Tobacco Use    Smoking status: Never    Smokeless tobacco: Former     Quit date: 1980   Substance and Sexual Activity    Alcohol use: Not on file    Drug use: Not on file    Sexual activity: Not on file   Other Topics Concern    Not on file   Social History Narrative    Not on file     Social Determinants of Health     Financial Resource Strain: Not on file   Food Insecurity: Not on file   Transportation Needs: Not on file   Physical Activity: Not on file   Stress: Not on file   Social Connections: Not on file   Interpersonal Safety: Not on file   Housing Stability: Not on file       Family History     Non contributory     ROS     12 ROS completed, pertinent positive and negative in HPI    Physical Exam   There were no vitals taken for this visit.   GENERAL: Healthy, alert and no distress  EYES: Eyes grossly normal to inspection.  No discharge or erythema, or obvious scleral/conjunctival abnormalities.  RESP: No audible wheeze, cough, or visible cyanosis.  No visible retractions or increased work of breathing.    SKIN: Visible skin clear. No significant rash, abnormal pigmentation or lesions.  NEURO: Cranial nerves grossly intact.  Mentation and speech appropriate for age.  PSYCH: Mentation appears normal, affect normal/bright, judgement and insight intact, normal speech and appearance well-groomed.     Labs/Imaging     Pertinent Labs were reviewed and updated in EPIC and discussed briefly.  Radiology Results were  reviewed and updated in EPIC and discussed briefly.    Summary of recent findings:   No results found for: A1C    TSH   Date Value Ref Range Status   11/28/2023 1.29 0.30 - 4.20 uIU/mL Final   06/05/2023 1.69 0.30 - 4.20 uIU/mL Final   06/03/2022 1.25 0.30 - 5.00 uIU/mL Final   12/14/2021 1.36 0.40 - 4.00 mU/L Final   09/08/2021 0.69 0.40 - 4.00 mU/L Final   08/11/2020 1.02 0.30 - 5.00 uIU/mL Final       Creatinine   Date Value Ref Range Status  "  06/05/2023 0.87 0.67 - 1.17 mg/dL Final       Recent Labs   Lab Test 06/03/22  1128 10/19/21  0956   CHOL 179 191   HDL 41 40    115   TRIG 172* 178*       No results found for: \"[id number]\", \"[id number]\", \"[id number]\"    I personally reviewed the patient's outside records from [facility] EMR and scanned records. Summary of pertinent findings in HPI.    Impression / Plan     1. Hypogonadism  Long history on T replacement since 2012. Diagnosis was not confirmed at that time, he had slightly low total T with normal Free T. No other hormonal work up. We discussed that at this point after being on T replacment for long time his pituitary access has been suppressed. He prefers to stay on it as his symptoms did improve on it. We reviewed during our initial visit T side effects including polycythemia and association with CVD and prostate cancer.    His hematocrit trended up to 55 on 3/2024. We decreased his T from 2 pack daily to 1 pack daily. He has been alternating 1 and 2 pack EOD. His most recent labs 6/27/24 hematocrit down to 50.     PSA was trending up but not more than 1.5 increase and still <4.  We discussed if any of these changes happen to his PSA we will plan for urology referral. He has no known BPH and no obstructive urinary sx. we will plan on rechecking levels in 3-month    Plan:  continue Transdermal T 1 pack alternating with 2 pack EOD.  Labs in 6month : T level, hematocrit and PSA       2. Hypothyroidism  No previous records of abnormal labs. He has been on LT4 100 mcg daily since 2012. Most likely primary hypothyroidism though no labs. Last TSH wnl November 2023    Plan:  Yearly TFT   Continue LT4 100 mcg daily    3.  Mild intermittent hypercalcemia  He had labs with intermittent mild hypercalcemia alternating with normal levels, most recent calcium checked 6/5/2023 was 10.3.  Labs on 12/14/2021 with calcium of 10, vitamin D 25 and PTH 58.  Old labs in 2021 calcium 10.5 and PTH 96. No calcium check " since 6/2023. He has upcoming visit with PCP at a local clinic. He will repeat labs and get back to me. Based on results we may decided on further work up.    4. Obesity, unsure of BMI  No recent wt and ht. He is interested in wt loss with lifestyle changes initially. Referral placed.     Test and/or medications prescribed today:  Orders Placed This Encounter   Procedures    TSH with free T4 reflex    Calcium    Albumin level    PSA tumor marker    Hematocrit    Adult Comprehensive Weight Management  Referral    Testosterone Free and Total         Follow up: 1 year  The longitudinal plan of care for the diagnosis(es)/condition(s) as documented were addressed during this visit. Due to the added complexity in care, I will continue to support Bill in the subsequent management and with ongoing continuity of care.      Deo Still MD  Endocrinology, Diabetes and Metabolism  [facility]

## 2024-07-02 NOTE — PATIENT INSTRUCTIONS
It was nice seeing you.    Continue testosterone at 1 packet alternating with 2 packets every other day    Testosterone labs in 6 month    Due for thyroid labs November 2024    I recommend rechecking calcium, given mild elevation the past.

## 2024-07-02 NOTE — NURSING NOTE
Is the patient currently in the state of [place]? YES    Visit mode:VIDEO    If the visit is dropped, the patient can be reconnected by: VIDEO VISIT: Text to cell phone:   Telephone Information:   Mobile [phone]       Will anyone else be joining the visit? NO  (If patient encounters technical issues they should call [phone] :[id number])    How would you like to obtain your AVS? MyChart    Are changes needed to the allergy or medication list? Pt stated no med changes    Are refills needed on medications prescribed by this physician? YES- pt needs to discuss with the provider depending on labs    Reason for visit: RECHEVY [place] VVF    Knee pain is chronic - pt receives injection next week for it.  Pain is situational. Depending if he kneels on it.

## 2024-07-25 ENCOUNTER — TELEPHONE (OUTPATIENT)
Dept: ENDOCRINOLOGY | Facility: CLINIC | Age: 69
End: 2024-07-25
Payer: COMMERCIAL

## 2024-07-25 NOTE — TELEPHONE ENCOUNTER
Patient confirmed scheduled appointment:  Date: 7/7/25  Time: 8:30 am  Visit type: return endo  Provider: Dr. Stlil  Location: virtual  Testing/imaging: labs 7/30/24, 11/11/24, 1/6/25  Additional notes: NA

## 2024-07-29 ENCOUNTER — MYC REFILL (OUTPATIENT)
Dept: ENDOCRINOLOGY | Facility: CLINIC | Age: 69
End: 2024-07-29
Payer: COMMERCIAL

## 2024-07-29 DIAGNOSIS — R79.89 LOW TESTOSTERONE: ICD-10-CM

## 2024-07-29 RX ORDER — TESTOSTERONE 40.5 MG/2.5G
81 GEL TOPICAL DAILY
Qty: 90 PACKET | Refills: 1 | Status: SHIPPED | OUTPATIENT
Start: 2024-07-29

## 2024-07-30 ENCOUNTER — LAB (OUTPATIENT)
Dept: LAB | Facility: CLINIC | Age: 69
End: 2024-07-30
Payer: COMMERCIAL

## 2024-07-30 DIAGNOSIS — R79.89 LOW TESTOSTERONE: ICD-10-CM

## 2024-07-30 LAB
ALBUMIN SERPL BCG-MCNC: 4.4 G/DL (ref 3.5–5.2)
CALCIUM SERPL-MCNC: 10.5 MG/DL (ref 8.8–10.4)
HOLD SPECIMEN: NORMAL
HOLD SPECIMEN: NORMAL

## 2024-07-30 PROCEDURE — 36415 COLL VENOUS BLD VENIPUNCTURE: CPT

## 2024-07-30 PROCEDURE — 82310 ASSAY OF CALCIUM: CPT

## 2024-07-30 PROCEDURE — 82040 ASSAY OF SERUM ALBUMIN: CPT

## 2024-08-05 DIAGNOSIS — E03.9 HYPOTHYROIDISM, UNSPECIFIED TYPE: Primary | ICD-10-CM

## 2024-08-05 DIAGNOSIS — E83.52 HYPERCALCEMIA: ICD-10-CM

## 2024-09-24 ENCOUNTER — LAB REQUISITION (OUTPATIENT)
Dept: LAB | Facility: CLINIC | Age: 69
End: 2024-09-24
Payer: COMMERCIAL

## 2024-09-24 DIAGNOSIS — E78.2 MIXED HYPERLIPIDEMIA: ICD-10-CM

## 2024-09-24 DIAGNOSIS — E03.9 HYPOTHYROIDISM, UNSPECIFIED: ICD-10-CM

## 2024-09-24 DIAGNOSIS — Z12.5 ENCOUNTER FOR SCREENING FOR MALIGNANT NEOPLASM OF PROSTATE: ICD-10-CM

## 2024-09-24 DIAGNOSIS — K75.81 NONALCOHOLIC STEATOHEPATITIS (NASH): ICD-10-CM

## 2024-09-24 LAB
ALBUMIN SERPL BCG-MCNC: 4.5 G/DL (ref 3.5–5.2)
ALP SERPL-CCNC: 82 U/L (ref 40–150)
ALT SERPL W P-5'-P-CCNC: 42 U/L (ref 0–70)
ANION GAP SERPL CALCULATED.3IONS-SCNC: 10 MMOL/L (ref 7–15)
AST SERPL W P-5'-P-CCNC: 27 U/L (ref 0–45)
BILIRUB SERPL-MCNC: 0.5 MG/DL
BUN SERPL-MCNC: 13 MG/DL (ref 8–23)
CALCIUM SERPL-MCNC: 10.7 MG/DL (ref 8.8–10.4)
CHLORIDE SERPL-SCNC: 105 MMOL/L (ref 98–107)
CHOLEST SERPL-MCNC: 183 MG/DL
CREAT SERPL-MCNC: 0.82 MG/DL (ref 0.67–1.17)
EGFRCR SERPLBLD CKD-EPI 2021: >90 ML/MIN/1.73M2
FASTING STATUS PATIENT QL REPORTED: ABNORMAL
FASTING STATUS PATIENT QL REPORTED: ABNORMAL
GLUCOSE SERPL-MCNC: 105 MG/DL (ref 70–99)
HCO3 SERPL-SCNC: 23 MMOL/L (ref 22–29)
HDLC SERPL-MCNC: 39 MG/DL
LDLC SERPL CALC-MCNC: 108 MG/DL
NONHDLC SERPL-MCNC: 144 MG/DL
POTASSIUM SERPL-SCNC: 4 MMOL/L (ref 3.4–5.3)
PROT SERPL-MCNC: 6.8 G/DL (ref 6.4–8.3)
PSA SERPL DL<=0.01 NG/ML-MCNC: 3.5 NG/ML (ref 0–4.5)
SODIUM SERPL-SCNC: 138 MMOL/L (ref 135–145)
TRIGL SERPL-MCNC: 182 MG/DL
TSH SERPL DL<=0.005 MIU/L-ACNC: 1.06 UIU/ML (ref 0.3–4.2)
VIT D+METAB SERPL-MCNC: 26 NG/ML (ref 20–50)

## 2024-09-24 PROCEDURE — 80061 LIPID PANEL: CPT | Mod: ORL | Performed by: FAMILY MEDICINE

## 2024-09-24 PROCEDURE — 80053 COMPREHEN METABOLIC PANEL: CPT | Mod: ORL | Performed by: FAMILY MEDICINE

## 2024-09-24 PROCEDURE — 84403 ASSAY OF TOTAL TESTOSTERONE: CPT | Mod: ORL | Performed by: FAMILY MEDICINE

## 2024-09-24 PROCEDURE — 82306 VITAMIN D 25 HYDROXY: CPT | Mod: ORL | Performed by: FAMILY MEDICINE

## 2024-09-24 PROCEDURE — G0103 PSA SCREENING: HCPCS | Mod: ORL | Performed by: FAMILY MEDICINE

## 2024-09-24 PROCEDURE — 84443 ASSAY THYROID STIM HORMONE: CPT | Mod: ORL | Performed by: FAMILY MEDICINE

## 2024-09-26 LAB — TESTOST SERPL-MCNC: 308 NG/DL (ref 240–950)

## 2024-10-03 ENCOUNTER — LAB REQUISITION (OUTPATIENT)
Dept: LAB | Facility: CLINIC | Age: 69
End: 2024-10-03
Payer: COMMERCIAL

## 2024-10-03 DIAGNOSIS — E83.52 HYPERCALCEMIA: ICD-10-CM

## 2024-10-03 LAB — CA-I BLD-MCNC: 5.4 MG/DL (ref 4.4–5.2)

## 2024-10-03 PROCEDURE — 82330 ASSAY OF CALCIUM: CPT | Mod: ORL | Performed by: FAMILY MEDICINE

## 2024-10-03 NOTE — PROGRESS NOTES
Medication Therapy Management (MTM) Encounter    ASSESSMENT:                            Medication Adherence/Access: No issues identified    Hypertension   Patient is not meeting blood pressure goal of < 140/90mmHg. Just started higher dose of lisinopril 2 days ago and starting Wegovy for weight loss. Will continue to monitor.    Hypothyroidism   Stable    Supplements   Stable     Obesity  Weight loss will likely help with elevated BP and OA pain.   Patient would benefit fromstarting Wegovy 0.25mg and titrating as tolerated .       PLAN:                            1. Continue current dose of lisinopril 30mg once daily. We discussed it can take a week or two to see the effect of medication dose change. Additionally, blood pressure may improve over the next few weeks/months with weight loss.    2. Start Wegovy 0.25mg injected once weekly. We were able to provide a sample in clinic today, and I will send in a prescription for the next dose, 0.5mg to the pharmacy. This medication decreases appetite, and slows how you absorb sugar into your blood.  It is generally very effective in helping with weight loss. There are some heart and kidney protective benefits long-term. Side effects we monitor for include nausea, vomiting, bloating, constipation or diarrhea, and abdominal pain.      Follow-up: Return in 20 days (on 10/24/2024) for Medication dose check, by phone. Then later in November for in person BP and weight check.    SUBJECTIVE/OBJECTIVE:                          Gene Jimenez is a 69 year old male seen for an initial visit. He was referred to me from Milton Culver.      Reason for visit: Medication review.    Allergies/ADRs: None  Past Medical History: Reviewed in chart  Tobacco: He reports that he has never smoked. He quit smokeless tobacco use about 44 years ago.  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 Culver; has taken this dose for 2 days so far  Patient reports no current medication side effects  Patient self monitors blood pressure.  Home BP monitoring 153/92, 148/95 .       Hypothyroidism   Levothyroxine 100 mcg daily.   Patient is having the following symptoms: none.   TSH   Date Value Ref Range Status   09/24/2024 1.06 0.30 - 4.20 uIU/mL Final   06/03/2022 1.25 0.30 - 5.00 uIU/mL Final   12/14/2021 1.36 0.40 - 4.00 mU/L Final        Supplements   Multivitamin daily  Vitamin C 1000mg 1-2 tablets daily  Vitamin D 2000IU 2 capsules daily  Zinc 30mg once daily  Fish Oil 1000mg 1-2 capsules daily  No reported issues at this time.        Weight Management   Wegovy 0.25 mg once weekly - hasn't started yet, here for teaching. Has not yet picked up from pharmacy    Nutrition/Eating Habits: Diet is \"ok\"; meat and potatos, not a lot of veggies. Loves food, loves to cook.    Exercise/Activity: 3 days per week at the gym and just started this about a month ago, active with woodworking and gardening.   Medications Tried/Failed:  None.   Medical History:  MEN2/Medullary Thyroid Cancer: none  Pancreatitis: none  Baseline GI symptomatology: none     Initial Consult Weight: 252 (9/24/24)     Current weight today: 255 lbs 3.2 oz  Cumulative Weight Gain: 3 lb, 1.1% from baseline    Wt Readings from Last 4 Encounters:   10/04/24 255 lb 3.2 oz (115.8 kg)     Estimated body mass index is 35.59 kg/m  as calculated from the following:    Height as of this encounter: 5' 11\" (1.803 m).    Weight as of this encounter: 255 lb 3.2 oz (115.8 kg).      Today's Vitals: BP (!) 160/80   Ht 5' 11\" (1.803 m)   Wt 255 lb 3.2 oz (115.8 kg)   BMI 35.59 kg/m    ----------------      I spent 45 minutes with this patient " today. All changes were made via collaborative practice agreement with Milton Culver MD. A copy of the visit note was provided to the patient's provider(s).    A summary of these recommendations was given to the patient.    Gladis Ulrich, Pharm.D., [facility]  Medication Therapy Management Pharmacist  [phone]          Medication Therapy Recommendations  Obesity due to excess calories, unspecified obesity severity    Current Medication: Semaglutide-Weight Management (WEGOVY SC)   Rationale: Does not understand instructions - Adherence - Adherence   Recommendation: Provide Education   Status: Patient Agreed - Adherence/Education

## 2024-10-04 ENCOUNTER — OFFICE VISIT (OUTPATIENT)
Dept: PHARMACY | Facility: PHYSICIAN GROUP | Age: 69
End: 2024-10-04
Payer: COMMERCIAL

## 2024-10-04 VITALS
BODY MASS INDEX: 35.73 KG/M2 | HEIGHT: 71 IN | WEIGHT: 255.2 LBS | SYSTOLIC BLOOD PRESSURE: 160 MMHG | DIASTOLIC BLOOD PRESSURE: 80 MMHG

## 2024-10-04 DIAGNOSIS — E03.9 HYPOTHYROIDISM, UNSPECIFIED TYPE: ICD-10-CM

## 2024-10-04 DIAGNOSIS — R79.89 LOW TESTOSTERONE: ICD-10-CM

## 2024-10-04 DIAGNOSIS — Z78.9 TAKES DIETARY SUPPLEMENTS: ICD-10-CM

## 2024-10-04 DIAGNOSIS — E66.09 OBESITY DUE TO EXCESS CALORIES, UNSPECIFIED OBESITY SEVERITY: ICD-10-CM

## 2024-10-04 DIAGNOSIS — I10 HYPERTENSION, ESSENTIAL: Primary | ICD-10-CM

## 2024-10-04 PROCEDURE — 99207 PR NO CHARGE LOS: CPT | Performed by: PHARMACIST

## 2024-10-04 NOTE — PATIENT INSTRUCTIONS
Recommendations from today's MTM visit:                                                      ASSESSMENT:                            Medication Adherence/Access: No issues identified    Hypertension   Patient is not meeting blood pressure goal of < 140/90mmHg. Just started higher dose of lisinopril 2 days ago and starting Wegovy for weight loss. Will continue to monitor.    Hypothyroidism   Stable    Supplements   Stable     Obesity  Weight loss will likely help with elevated BP and OA pain.   Patient would benefit fromstarting Wegovy 0.25mg and titrating as tolerated.       PLAN:                            1. Continue current dose of lisinopril 30mg once daily. We discussed it can take a week or two to see the effect of medication dose change. Additionally, blood pressure may improve over the next few weeks/months with weight loss.    2. Start Wegovy 0.25mg injected once weekly. We were able to provide a sample in clinic today, and I will send in a prescription for the next dose, 0.5mg to the pharmacy. This medication decreases appetite, and slows how you absorb sugar into your blood.  It is generally very effective in helping with weight loss. There are some heart and kidney protective benefits long-term. Side effects we monitor for include nausea, vomiting, bloating, constipation or diarrhea, and abdominal pain.      Follow-up: Return in 20 days (on 10/24/2024) for Medication dose check, by phone. Then later in November for in person BP and weight check.    It was great to speak with you today.  I value your experience and would be very thankful for your time with providing feedback on our clinic survey. You may receive a survey via email or text message in the next few days.     To schedule another MTM appointment, please call the clinic directly or you may call the scheduling line at [phone].    My Clinical Pharmacist's contact information:                                                      Please feel  free to contact me with any questions or concerns you have.      Gladis Ulrich, Pharm.D., [facility]  Medication Therapy Management Pharmacist  [phone]

## 2024-10-23 NOTE — PROGRESS NOTES
Medication Therapy Management (MTM) Encounter    ASSESSMENT:                            Medication Adherence/Access: Cost issues identified    Hypertension   Patient is not meeting blood pressure goal of < 140/90mmHg. He would likely benefit from addition of hydrochlorothiazide for further BP control.     Obesity  Weight loss will likely help with elevated BP and OA pain, GLP-1 RA not covered, will need to consider more affordable alternatives.        PLAN:                            1. Continue current dose of lisinopril 30mg once daily AND start taking hydrochlorothiazide 25mg one tablet by mouth every morning for further reduction to blood pressure. Continue to monitor BP, and watch for side effects including increased urination, dizziness or lightheadedness.    2. We'll discuss alternative weight loss medications at your next appointment, after your upcoming trip.      Follow-up: Return in 27 days (on 11/20/2024) for Medication Therapy Management. Then later in November for in person BP and weight check.    SUBJECTIVE/OBJECTIVE:                          Gene Jiemnez is a 69 year old male seen for an initial visit. He was referred to me from Milton Culver.      Reason for visit: Medication review.    Allergies/ADRs: None  Past Medical History: Reviewed in chart  Tobacco: He reports that he has never smoked. He quit smokeless tobacco use about 44 years ago.  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    Medication Adherence/Access: no issues reported  Leaving Saturday for above trip    Hypertension   Lisinopril 30mg daily - dose increased at last visit with Dr. Culver  Patient reports no current medication side effects  Patient self monitors blood pressure.  Home BP monitoring   146/86mmHg avg over the last few weeks,  "159/91mmHg this morning     Last Comprehensive Metabolic Panel:  Lab Results   Component Value Date     09/24/2024    POTASSIUM 4.0 09/24/2024    CHLORIDE 105 09/24/2024    CO2 23 09/24/2024    ANIONGAP 10 09/24/2024     (H) 09/24/2024    BUN 13.0 09/24/2024    CR 0.82 09/24/2024    GFRESTIMATED >90 09/24/2024    STEFAN 10.7 (H) 09/24/2024       Weight Management   Wegovy 0.25 mg once weekly - started at last visit and will take last dose tomorrow, denies side effects and notes he has been experiencing appetite suppression and noticing some weight loss, about 5lbs    Unfortunately, medication is not covered by insurance and won't be affordable long term. His insurance does not cover any weight loss medications. We briefly discussed other options, including compounded semaglutide or oral weight loss medications. He is interested in exploring these options further after his upcoming travel.    Nutrition/Eating Habits: Diet is \"ok\"; meat and potatos, not a lot of veggies. Loves food, loves to cook.    Exercise/Activity: 3 days per week at the gym and just started this about a month ago, active with woodworking and gardening.   Medications Tried/Failed:  None.   Medical History:  MEN2/Medullary Thyroid Cancer: none  Pancreatitis: none  Baseline GI symptomatology: none     Initial Consult Weight: 252 (9/24/24)     Current weight today: 0 lbs 0 oz 247lbs per home scale  Cumulative Weight loss: 5 lb, 1.9% from baseline    Wt Readings from Last 4 Encounters:   10/04/24 255 lb 3.2 oz (115.8 kg)     Estimated body mass index is 35.59 kg/m  as calculated from the following:    Height as of 10/4/24: 5' 11\" (1.803 m).    Weight as of 10/4/24: 255 lb 3.2 oz (115.8 kg).      Today's Vitals: There were no vitals taken for this visit.  ----------------      I spent 27 minutes with this patient today. All changes were made via collaborative practice agreement with Milton Culver MD. A copy of the visit note was provided to " the patient's provider(s).    A summary of these recommendations was given to the patient.    Gladis Ulrich, Pharm.D., [facility]  Medication Therapy Management Pharmacist  [phone]     Telemedicine Visit Details  The patient's medications can be safely assessed via a telemedicine encounter.  Type of service:  Telephone visit  Originating Location (pt. Location): Home    Distant Location (provider location):  On-site  Start Time: 9:58 AM  End Time: 10:25 AM     Medication Therapy Recommendations  Hypertension, essential   1 Current Medication: hydrochlorothiazide (HYDRODIURIL) 25 MG tablet   Current Medication Sig: Take 25 mg by mouth daily.   Rationale: Synergistic therapy - Needs additional medication therapy - Indication   Recommendation: Start Medication   Status: Accepted per CPA   Identified Date: 10/24/2024 Completed Date: 10/24/2024

## 2024-10-24 ENCOUNTER — VIRTUAL VISIT (OUTPATIENT)
Dept: PHARMACY | Facility: PHYSICIAN GROUP | Age: 69
End: 2024-10-24
Payer: COMMERCIAL

## 2024-10-24 DIAGNOSIS — E66.09 OBESITY DUE TO EXCESS CALORIES, UNSPECIFIED OBESITY SEVERITY: ICD-10-CM

## 2024-10-24 DIAGNOSIS — I10 HYPERTENSION, ESSENTIAL: Primary | ICD-10-CM

## 2024-10-24 PROCEDURE — 99207 PR NO CHARGE LOS: CPT | Mod: 93 | Performed by: PHARMACIST

## 2024-10-24 RX ORDER — HYDROCHLOROTHIAZIDE 25 MG/1
25 TABLET ORAL DAILY
COMMUNITY

## 2024-10-24 NOTE — PATIENT INSTRUCTIONS
Recommendations from today's MTM visit:                                                      ASSESSMENT:                            Medication Adherence/Access: Cost issues identified    Hypertension   Patient is not meeting blood pressure goal of < 140/90mmHg. He would likely benefit from addition of hydrochlorothiazide for further BP control.     Obesity  Weight loss will likely help with elevated BP and OA pain, GLP-1 RA not covered, will need to consider more affordable alternatives.        PLAN:                            1. Continue current dose of lisinopril 30mg once daily AND start taking hydrochlorothiazide 25mg one tablet by mouth every morning for further reduction to blood pressure. Continue to monitor BP, and watch for side effects including increased urination, dizziness or lightheadedness.    2. We'll discuss alternative weight loss medications at your next appointment, after your upcoming trip.      Follow-up: Return in 27 days (on 11/20/2024) for Medication Therapy Management. Then later in November for in person BP and weight check.    It was great to speak with you today.  I value your experience and would be very thankful for your time with providing feedback on our clinic survey. You may receive a survey via email or text message in the next few days.     To schedule another MTM appointment, please call the clinic directly or you may call the scheduling line at [phone].    My Clinical Pharmacist's contact information:                                                      Please feel free to contact me with any questions or concerns you have.      Gladis Ulrich, Pharm.D., [facility]  Medication Therapy Management Pharmacist  [phone]

## 2024-11-11 ENCOUNTER — LAB (OUTPATIENT)
Dept: LAB | Facility: CLINIC | Age: 69
End: 2024-11-11
Payer: COMMERCIAL

## 2024-11-11 DIAGNOSIS — E83.52 HYPERCALCEMIA: ICD-10-CM

## 2024-11-11 DIAGNOSIS — E03.9 HYPOTHYROIDISM, UNSPECIFIED TYPE: ICD-10-CM

## 2024-11-11 DIAGNOSIS — R79.89 LOW TESTOSTERONE: ICD-10-CM

## 2024-11-11 DIAGNOSIS — R97.20 ELEVATED PSA: ICD-10-CM

## 2024-11-11 LAB
ALBUMIN SERPL BCG-MCNC: 4.5 G/DL (ref 3.5–5.2)
CALCIUM SERPL-MCNC: 11.1 MG/DL (ref 8.8–10.4)
CREAT SERPL-MCNC: 0.87 MG/DL (ref 0.67–1.17)
EGFRCR SERPLBLD CKD-EPI 2021: >90 ML/MIN/1.73M2
HCT VFR BLD AUTO: 54.6 % (ref 40–53)
MAGNESIUM SERPL-MCNC: 2.2 MG/DL (ref 1.7–2.3)
PHOSPHATE SERPL-MCNC: 3.4 MG/DL (ref 2.5–4.5)
PSA SERPL DL<=0.01 NG/ML-MCNC: 6.11 NG/ML (ref 0–4.5)
PTH-INTACT SERPL-MCNC: 40 PG/ML (ref 15–65)
SHBG SERPL-SCNC: 19 NMOL/L (ref 11–80)
TSH SERPL DL<=0.005 MIU/L-ACNC: 1.41 UIU/ML (ref 0.3–4.2)

## 2024-11-11 PROCEDURE — 82310 ASSAY OF CALCIUM: CPT

## 2024-11-11 PROCEDURE — 85014 HEMATOCRIT: CPT

## 2024-11-11 PROCEDURE — 84153 ASSAY OF PSA TOTAL: CPT | Mod: GZ

## 2024-11-11 PROCEDURE — 84270 ASSAY OF SEX HORMONE GLOBUL: CPT

## 2024-11-11 PROCEDURE — 84403 ASSAY OF TOTAL TESTOSTERONE: CPT

## 2024-11-11 PROCEDURE — 84443 ASSAY THYROID STIM HORMONE: CPT | Mod: GZ

## 2024-11-11 PROCEDURE — 84100 ASSAY OF PHOSPHORUS: CPT

## 2024-11-11 PROCEDURE — 83735 ASSAY OF MAGNESIUM: CPT

## 2024-11-11 PROCEDURE — 83970 ASSAY OF PARATHORMONE: CPT

## 2024-11-11 PROCEDURE — 82565 ASSAY OF CREATININE: CPT

## 2024-11-11 PROCEDURE — 36415 COLL VENOUS BLD VENIPUNCTURE: CPT

## 2024-11-11 PROCEDURE — 82040 ASSAY OF SERUM ALBUMIN: CPT

## 2024-11-11 PROCEDURE — 82306 VITAMIN D 25 HYDROXY: CPT

## 2024-11-13 LAB
DEPRECATED CALCIDIOL+CALCIFEROL SERPL-MC: <43 UG/L (ref 20–75)
TESTOST FREE SERPL-MCNC: 9.2 NG/DL
TESTOST SERPL-MCNC: 348 NG/DL (ref 240–950)
VITAMIN D2 SERPL-MCNC: <5 UG/L
VITAMIN D3 SERPL-MCNC: 38 UG/L

## 2024-11-14 DIAGNOSIS — R79.89 LOW TESTOSTERONE: ICD-10-CM

## 2024-11-14 DIAGNOSIS — R94.8 ABNORMAL RESULTS OF FUNCTION STUDIES OF OTHER ORGANS AND SYSTEMS: ICD-10-CM

## 2024-11-14 RX ORDER — TESTOSTERONE 40.5 MG/2.5G
40.5 GEL TOPICAL DAILY
Status: SHIPPED
Start: 2024-11-14

## 2024-11-20 ENCOUNTER — OFFICE VISIT (OUTPATIENT)
Dept: PHARMACY | Facility: PHYSICIAN GROUP | Age: 69
End: 2024-11-20
Payer: COMMERCIAL

## 2024-11-20 ENCOUNTER — LAB REQUISITION (OUTPATIENT)
Dept: LAB | Facility: CLINIC | Age: 69
End: 2024-11-20
Payer: COMMERCIAL

## 2024-11-20 VITALS — WEIGHT: 243.6 LBS | BODY MASS INDEX: 33.98 KG/M2

## 2024-11-20 DIAGNOSIS — I10 HYPERTENSION, ESSENTIAL: Primary | ICD-10-CM

## 2024-11-20 DIAGNOSIS — E66.09 OBESITY DUE TO EXCESS CALORIES, UNSPECIFIED OBESITY SEVERITY: ICD-10-CM

## 2024-11-20 DIAGNOSIS — I10 ESSENTIAL (PRIMARY) HYPERTENSION: ICD-10-CM

## 2024-11-20 LAB
ANION GAP SERPL CALCULATED.3IONS-SCNC: 12 MMOL/L (ref 7–15)
BUN SERPL-MCNC: 11.7 MG/DL (ref 8–23)
CALCIUM SERPL-MCNC: 11 MG/DL (ref 8.8–10.4)
CHLORIDE SERPL-SCNC: 100 MMOL/L (ref 98–107)
CREAT SERPL-MCNC: 0.87 MG/DL (ref 0.67–1.17)
EGFRCR SERPLBLD CKD-EPI 2021: >90 ML/MIN/1.73M2
GLUCOSE SERPL-MCNC: 99 MG/DL (ref 70–99)
HCO3 SERPL-SCNC: 25 MMOL/L (ref 22–29)
POTASSIUM SERPL-SCNC: 4 MMOL/L (ref 3.4–5.3)
SODIUM SERPL-SCNC: 137 MMOL/L (ref 135–145)

## 2024-11-20 PROCEDURE — 80048 BASIC METABOLIC PNL TOTAL CA: CPT | Mod: ORL | Performed by: FAMILY MEDICINE

## 2024-11-20 RX ORDER — BUPROPION HYDROCHLORIDE 100 MG/1
100 TABLET, EXTENDED RELEASE ORAL 2 TIMES DAILY
COMMUNITY

## 2024-11-20 NOTE — PATIENT INSTRUCTIONS
Recommendations from today's MTM visit:                                                      1. We are checking a basic metabolic panel today, particularly to recheck recent elevated calcium levels, which could be a side effect of the hydrochlorothiazide.    2. Start bupropion SR 100mg every morning for 7 days, then increase to twice daily. Be sure to take your PM dose in the late afternoon/early evening so it doesn't impact your sleep    Instructions  Swallow the medicine without crushing or chewing it.  This medicine may be taken with or without food.  Try to avoid taking the medicine at bedtime.  Keep the medicine at room temperature. Avoid heat and direct light.  It may take several weeks for this medicine to fully work.  It is important that you keep taking each dose of this medicine on time even if you are feeling well.  If you forget to take a dose on time, take it as soon as you remember. If it is almost time for the next dose, do not take the missed dose. Return to your normal dosing schedule. Do not take 2 doses of this medicine at one time.  Please tell your doctor and pharmacist about all the medicines you take. Include both prescription and over-the-counter medicines. Also tell them about any vitamins, herbal medicines, or anything else you take for your health.  Do not suddenly stop taking this medicine. Check with your doctor before stopping.    Cautions  Tell your doctor and pharmacist if you ever had an allergic reaction to a medicine. Symptoms of an allergic reaction can include trouble breathing, skin rash, itching, swelling, or severe dizziness.  This medicine is associated with an increased risk for seizures. Please ask your doctor whether you may be at risk for having a seizure while on this medicine.  Do not use the medication any more than instructed.  Your ability to stay alert or to react quickly may be impaired by this medicine. Do not drive or operate machinery until you know how this  medicine will affect you.  Please check with your doctor before drinking alcohol while on this medicine.  Family should check on the patient often. Call the doctor if patient becomes more depressed, has thoughts of suicide, or shows changes in behavior.  Call the doctor if there are any signs of confusion or unusual changes in behavior.  Tell the doctor or pharmacist if you are pregnant, planning to be pregnant, or breastfeeding.  Ask your pharmacist if this medicine can interact with any of your other medicines. Be sure to tell them about all the medicines you take.  Do not start or stop any other medicines without first speaking to your doctor or pharmacist.  Do not share this medicine with anyone who has not been prescribed this medicine.  This medicine is associated with increased risk of death in certain patients. Please speak with your doctor about the benefits and risks of using this medicine.  This medicine can cause serious side effects in some patients. Important information from the U.S. Food and Drug Administration (FDA) is available from your pharmacist. Please review it carefully with your pharmacist to understand the risks associated with this medicine.    Side Effects  The following is a list of some common side effects from this medicine. Please speak with your doctor about what you should do if you experience these or other side effects.  agitated feeling or trouble sleeping  decreased appetite  increased appetite  constipation  dizziness  drowsiness or sedation  dry mouth  headaches  high blood pressure  itching  muscle pain  nausea  skin irritation such as redness, itching, rash, or burning  problems with sexual functions or desire  sore throat  stomach upset or abdominal pain  sweating  vomiting  weight loss  If you have any of the following side effects, you may be getting too much medicine. Please contact your doctor to let them know about these side effects.  change in  behavior  confusion  diarrhea  fainting  hallucinations (unusual thoughts, seeing or hearing things that are not real)  rapid heartbeat  pain in the joints  tight or rigid muscles  muscle trembling  Call your doctor or get medical help right away if you notice any of these more serious side effects:  chest pain  fast or irregular heart beats  dilation of the pupils  seizures  shortness of breath        Follow-up: Return in 7 weeks (on 1/8/2025) for Medication dose check, in person, at 9:30am.     It was great to speak with you today.  I value your experience and would be very thankful for your time with providing feedback on our clinic survey. You may receive a survey via email or text message in the next few days.     To schedule another MTM appointment, please call the clinic directly or you may call the scheduling line at [phone].    My Clinical Pharmacist's contact information:                                                      Please contact the clinic with any questions or concerns you have.      Gladis Ulrich, Pharm.D., BCACP  Medication Therapy Management Pharmacist

## 2024-11-20 NOTE — PROGRESS NOTES
Medication Therapy Management (MTM) Encounter    ASSESSMENT:                            Medication Adherence/Access: Cost issues identified    Hypertension   Patient is meeting blood pressure goal of < 140/90mmHg. Recheck BMP today due to recent elevated calcium level.    Obesity  Weight loss will likely help with elevated BP and OA pain, GLP-1 RA not covered, will trial bupropion.        PLAN:                            1. We are checking a basic metabolic panel today, particularly to recheck recent elevated calcium levels, which could be a side effect of the hydrochlorothiazide.    2. Start bupropion SR 100mg every morning for 7 days, then increase to twice daily. Be sure to take your PM dose in the late afternoon/early evening so it doesn't impact your sleep    Instructions  Swallow the medicine without crushing or chewing it.  This medicine may be taken with or without food.  Try to avoid taking the medicine at bedtime.  Keep the medicine at room temperature. Avoid heat and direct light.  It may take several weeks for this medicine to fully work.  It is important that you keep taking each dose of this medicine on time even if you are feeling well.  If you forget to take a dose on time, take it as soon as you remember. If it is almost time for the next dose, do not take the missed dose. Return to your normal dosing schedule. Do not take 2 doses of this medicine at one time.  Please tell your doctor and pharmacist about all the medicines you take. Include both prescription and over-the-counter medicines. Also tell them about any vitamins, herbal medicines, or anything else you take for your health.  Do not suddenly stop taking this medicine. Check with your doctor before stopping.    Cautions  Tell your doctor and pharmacist if you ever had an allergic reaction to a medicine. Symptoms of an allergic reaction can include trouble breathing, skin rash, itching, swelling, or severe dizziness.  This medicine is  associated with an increased risk for seizures. Please ask your doctor whether you may be at risk for having a seizure while on this medicine.  Do not use the medication any more than instructed.  Your ability to stay alert or to react quickly may be impaired by this medicine. Do not drive or operate machinery until you know how this medicine will affect you.  Please check with your doctor before drinking alcohol while on this medicine.  Family should check on the patient often. Call the doctor if patient becomes more depressed, has thoughts of suicide, or shows changes in behavior.  Call the doctor if there are any signs of confusion or unusual changes in behavior.  Tell the doctor or pharmacist if you are pregnant, planning to be pregnant, or breastfeeding.  Ask your pharmacist if this medicine can interact with any of your other medicines. Be sure to tell them about all the medicines you take.  Do not start or stop any other medicines without first speaking to your doctor or pharmacist.  Do not share this medicine with anyone who has not been prescribed this medicine.  This medicine is associated with increased risk of death in certain patients. Please speak with your doctor about the benefits and risks of using this medicine.  This medicine can cause serious side effects in some patients. Important information from the U.S. Food and Drug Administration (FDA) is available from your pharmacist. Please review it carefully with your pharmacist to understand the risks associated with this medicine.    Side Effects  The following is a list of some common side effects from this medicine. Please speak with your doctor about what you should do if you experience these or other side effects.  agitated feeling or trouble sleeping  decreased appetite  increased appetite  constipation  dizziness  drowsiness or sedation  dry mouth  headaches  high blood pressure  itching  muscle pain  nausea  skin irritation such as redness,  itching, rash, or burning  problems with sexual functions or desire  sore throat  stomach upset or abdominal pain  sweating  vomiting  weight loss  If you have any of the following side effects, you may be getting too much medicine. Please contact your doctor to let them know about these side effects.  change in behavior  confusion  diarrhea  fainting  hallucinations (unusual thoughts, seeing or hearing things that are not real)  rapid heartbeat  pain in the joints  tight or rigid muscles  muscle trembling  Call your doctor or get medical help right away if you notice any of these more serious side effects:  chest pain  fast or irregular heart beats  dilation of the pupils  seizures  shortness of breath      Follow-up: Return in 7 weeks (on 1/8/2025) for Medication dose check, in person, at 9:30am.     SUBJECTIVE/OBJECTIVE:                          Gene Jimenez is a 69 year old male seen for a follow-up visit.     Reason for visit: Medication review.    Allergies/ADRs: None  Past Medical History: Reviewed in chart  Tobacco: He reports that he has never smoked. He quit smokeless tobacco use about 44 years ago.  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 Did not make it to [place].    Hypertension   Lisinopril 30mg daily  Hydrochlorothiazide 25mg daily - started last visit  Patient reports no current medication side effects  Patient self monitors blood pressure.  Home BP monitoring   129/79mmHg average the last month     Last Comprehensive Metabolic Panel: Rechecked today, labs pending  Lab Results   Component Value Date     09/24/2024    POTASSIUM 4.0 09/24/2024    CHLORIDE 105 09/24/2024    CO2 23 09/24/2024    ANIONGAP 10 09/24/2024     (H) 09/24/2024    BUN 13.0 09/24/2024  "   CR 0.87 11/11/2024    GFRESTIMATED >90 11/11/2024    STEFAN 11.1 (H) 11/11/2024       Weight Management   No current medications  He was able to be on Wegovy for 1 month, but insurance did not cover this long term. He did lose some weight and felt reduced appetite while taking. Notices improved joint pain carrying less weight around  We discussed alternatives today including compounded GLP-1's, GLP/GIP, weight loss program through a reputable place like [employer], [facility] [employer], or [facility], or generic oral weight loss medications. He would like to try oral medications to help maintain and/or help with weight loss.    Nutrition/Eating Habits: Diet is \"ok\"; meat and potatos, not a lot of veggies. Loves food, loves to cook.    Exercise/Activity: 3 days per week at the gym and just started this about 1-2 months ago, active with woodworking and gardening.   Medications Tried/Failed:  None.   Medical History:  MEN2/Medullary Thyroid Cancer: none  Pancreatitis: none  Baseline GI symptomatology: none     Initial Consult Weight: 252 (9/24/24)     Current weight today: 243 lbs 9.6 oz   Cumulative Weight loss: -9 lb, -3.5% from baseline    Wt Readings from Last 4 Encounters:   11/20/24 243 lb 9.6 oz (110.5 kg)   10/04/24 255 lb 3.2 oz (115.8 kg)     Estimated body mass index is 33.98 kg/m  as calculated from the following:    Height as of 10/4/24: 5' 11\" (1.803 m).    Weight as of this encounter: 243 lb 9.6 oz (110.5 kg).      Today's Vitals: Wt 243 lb 9.6 oz (110.5 kg)   BMI 33.98 kg/m    ----------------      I spent 27 minutes with this patient today. All changes were made via collaborative practice agreement with Milton Culver MD. A copy of the visit note was provided to the patient's provider(s).    A summary of these recommendations was given to the patient.    Gladis Ulrich, Pharm.D., [facility]  Medication Therapy Management Pharmacist  [phone]        Medication Therapy " Recommendations  Hypertension, essential   1 Current Medication: hydrochlorothiazide (HYDRODIURIL) 25 MG tablet   Current Medication Sig: Take 25 mg by mouth daily.   Rationale: Medication requires monitoring - Needs additional monitoring   Recommendation: Order Lab   Status: Accepted per CPA   Identified Date: 11/20/2024 Completed Date: 11/20/2024         Obesity due to excess calories, unspecified obesity severity   1 Current Medication: buPROPion (WELLBUTRIN SR) 100 MG 12 hr tablet   Current Medication Sig: Take 100 mg by mouth 2 times daily.   Rationale: Untreated condition - Needs additional medication therapy - Indication   Recommendation: Start Medication   Status: Accepted per CPA   Identified Date: 11/20/2024 Completed Date: 11/20/2024

## 2024-11-22 ENCOUNTER — LAB REQUISITION (OUTPATIENT)
Dept: LAB | Facility: CLINIC | Age: 69
End: 2024-11-22
Payer: COMMERCIAL

## 2024-11-22 DIAGNOSIS — E83.52 HYPERCALCEMIA: ICD-10-CM

## 2024-11-22 DIAGNOSIS — R97.20 ELEVATED PROSTATE SPECIFIC ANTIGEN (PSA): ICD-10-CM

## 2024-11-22 DIAGNOSIS — I10 ESSENTIAL (PRIMARY) HYPERTENSION: ICD-10-CM

## 2024-11-22 LAB
ANION GAP SERPL CALCULATED.3IONS-SCNC: 11 MMOL/L (ref 7–15)
BUN SERPL-MCNC: 15.2 MG/DL (ref 8–23)
CALCIUM SERPL-MCNC: 10.6 MG/DL (ref 8.8–10.4)
CHLORIDE SERPL-SCNC: 102 MMOL/L (ref 98–107)
CREAT SERPL-MCNC: 0.81 MG/DL (ref 0.67–1.17)
EGFRCR SERPLBLD CKD-EPI 2021: >90 ML/MIN/1.73M2
GLUCOSE SERPL-MCNC: 100 MG/DL (ref 70–99)
HCO3 SERPL-SCNC: 23 MMOL/L (ref 22–29)
POTASSIUM SERPL-SCNC: 3.8 MMOL/L (ref 3.4–5.3)
PSA SERPL DL<=0.01 NG/ML-MCNC: 4.66 NG/ML (ref 0–4.5)
PTH-INTACT SERPL-MCNC: 56 PG/ML (ref 15–65)
SODIUM SERPL-SCNC: 136 MMOL/L (ref 135–145)

## 2024-11-22 PROCEDURE — 80048 BASIC METABOLIC PNL TOTAL CA: CPT | Mod: ORL | Performed by: FAMILY MEDICINE

## 2024-11-22 PROCEDURE — 83970 ASSAY OF PARATHORMONE: CPT | Mod: ORL | Performed by: FAMILY MEDICINE

## 2024-11-22 PROCEDURE — 84153 ASSAY OF PSA TOTAL: CPT | Mod: ORL | Performed by: FAMILY MEDICINE

## 2024-12-02 DIAGNOSIS — R79.89 LOW TESTOSTERONE: ICD-10-CM

## 2024-12-02 RX ORDER — TESTOSTERONE 40.5 MG/2.5G
40.5 GEL TOPICAL DAILY
Qty: 90 PACKET | Refills: 0 | Status: SHIPPED | OUTPATIENT
Start: 2024-12-02

## 2024-12-02 NOTE — TELEPHONE ENCOUNTER
[facility] Call Center    Phone Message    May a detailed message be left on voicemail: yes     Reason for Call: Medication Refill Request    Has the patient contacted the pharmacy for the refill? Yes   Name of medication being requested: testosterone 40.5 MG/2.5GM (1.62%) GEL   Provider who prescribed the medication: Alameddine  Pharmacy: [facility] #[id number] [address] AT [address]    Date medication is needed: ASAP, will be out of town for a week.       Action Taken: Message routed to:  [facility] ([facility] Endo    Travel Screening: Not Applicable     Date of Service:

## 2024-12-30 ENCOUNTER — LAB REQUISITION (OUTPATIENT)
Dept: LAB | Facility: CLINIC | Age: 69
End: 2024-12-30
Payer: COMMERCIAL

## 2024-12-30 DIAGNOSIS — R97.20 ELEVATED PROSTATE SPECIFIC ANTIGEN (PSA): ICD-10-CM

## 2024-12-30 LAB — PSA SERPL DL<=0.01 NG/ML-MCNC: 4.1 NG/ML (ref 0–4.5)

## 2024-12-30 PROCEDURE — 84153 ASSAY OF PSA TOTAL: CPT | Mod: ORL | Performed by: FAMILY MEDICINE

## 2024-12-30 PROCEDURE — 84153 ASSAY OF PSA TOTAL: CPT | Performed by: FAMILY MEDICINE

## 2025-01-06 ENCOUNTER — LAB (OUTPATIENT)
Dept: LAB | Facility: CLINIC | Age: 70
End: 2025-01-06
Payer: COMMERCIAL

## 2025-01-06 DIAGNOSIS — R94.8 ABNORMAL RESULTS OF FUNCTION STUDIES OF OTHER ORGANS AND SYSTEMS: ICD-10-CM

## 2025-01-06 DIAGNOSIS — R79.89 LOW TESTOSTERONE: ICD-10-CM

## 2025-01-06 LAB
HCT VFR BLD AUTO: 52.5 % (ref 40–53)
HGB BLD-MCNC: 17.5 G/DL (ref 13.3–17.7)
PSA SERPL DL<=0.01 NG/ML-MCNC: 3.72 NG/ML (ref 0–4.5)
SHBG SERPL-SCNC: 19 NMOL/L (ref 11–80)

## 2025-01-06 PROCEDURE — 84153 ASSAY OF PSA TOTAL: CPT | Mod: GZ

## 2025-01-06 PROCEDURE — 84403 ASSAY OF TOTAL TESTOSTERONE: CPT

## 2025-01-06 PROCEDURE — 85018 HEMOGLOBIN: CPT

## 2025-01-06 PROCEDURE — 84270 ASSAY OF SEX HORMONE GLOBUL: CPT

## 2025-01-06 PROCEDURE — 36415 COLL VENOUS BLD VENIPUNCTURE: CPT

## 2025-01-06 PROCEDURE — 85014 HEMATOCRIT: CPT

## 2025-01-07 NOTE — TELEPHONE ENCOUNTER
Central Prior Authorization Team   Phone: [phone]      PA Initiation-Phone call from insurance said patient is requesting PA. Completed via phone with Frances at [facility]    Medication: testosterone 40.5 MG/2.5GM (1.62%) GEL-PA Initiated  Insurance Company:    Pharmacy Filling the Rx: [facility] - [place], [place] - [address]  Filling Pharmacy Phone: [phone]  Filling Pharmacy Fax:    Start Date: 3/20/2023       Goal Outcome Evaluation:         Unable to wean. Went up to 60% FiO2 post abg

## 2025-01-08 ENCOUNTER — OFFICE VISIT (OUTPATIENT)
Dept: PHARMACY | Facility: PHYSICIAN GROUP | Age: 70
End: 2025-01-08
Payer: COMMERCIAL

## 2025-01-08 VITALS — DIASTOLIC BLOOD PRESSURE: 80 MMHG | BODY MASS INDEX: 34.95 KG/M2 | SYSTOLIC BLOOD PRESSURE: 128 MMHG | WEIGHT: 250.6 LBS

## 2025-01-08 DIAGNOSIS — E78.2 MIXED HYPERLIPIDEMIA: ICD-10-CM

## 2025-01-08 DIAGNOSIS — E03.9 HYPOTHYROIDISM, UNSPECIFIED TYPE: ICD-10-CM

## 2025-01-08 DIAGNOSIS — Z78.9 TAKES DIETARY SUPPLEMENTS: ICD-10-CM

## 2025-01-08 DIAGNOSIS — I10 HYPERTENSION, ESSENTIAL: Primary | ICD-10-CM

## 2025-01-08 DIAGNOSIS — E66.09 OBESITY DUE TO EXCESS CALORIES, UNSPECIFIED OBESITY SEVERITY: ICD-10-CM

## 2025-01-08 LAB
TESTOST FREE SERPL-MCNC: 4.44 NG/DL
TESTOST SERPL-MCNC: 177 NG/DL (ref 240–950)

## 2025-01-08 RX ORDER — TIRZEPATIDE 2.5 MG/.5ML
2.5 INJECTION, SOLUTION SUBCUTANEOUS
COMMUNITY

## 2025-01-08 NOTE — LETTER
January 8, 2025  Miquel VENKAT Jimenez  [address]  [facility] [address]    Dear Mr. Jimenez, [facility]     Thank you for talking with me on Jan 8, 2025 about your health and medications. As a follow-up to our conversation, I have included two documents:      Your Recommended To-Do List has steps you should take to get the best results from your medications.  Your Medication List will help you keep track of your medications and how to take them.    If you want to talk about these documents, please call Gladis Ulrich RPH, PharmD, BCACP  at phone: [phone], Monday-Friday 8-4:30pm.    I look forward to working with you and your doctors to make sure your medications work well for you.    Sincerely,  Gladis Ulrich RPH, PharmD, BCACP   [facility] Pharmacist, [facility] and [facility]

## 2025-01-08 NOTE — LETTER
_  Medication List        Prepared on: Jan 8, 2025     Bring your Medication List when you go to the doctor, hospital, or   emergency room. And, share it with your family or caregivers.     Note any changes to how you take your medications.  Cross out medications when you no longer use them.    Medication How I take it Why I use it Prescriber   buPROPion (WELLBUTRIN SR) 100 MG 12 hr tablet Take 100 mg by mouth 2 times daily.  Weight Management Patient Reported   celecoxib (CELEBREX) 200 MG capsule Take 200 mg by mouth daily. Acute Pain Milton Culver MD   fish oil-omega-3 fatty acids 1000 MG capsule Take 1 g by mouth daily. Disease involving Lipid Deposits in the Arteries; General health Patient Reported   hydrochlorothiazide (HYDRODIURIL) 25 MG tablet Take 25 mg by mouth daily. High Blood Pressure Milton Culver MD   levothyroxine (SYNTHROID/LEVOTHROID) 100 MCG tablet Take 100 mcg by mouth daily. Underactive Thyroid Milton Culver MD   lisinopril (ZESTRIL) 10 MG tablet Take 30 mg by mouth daily. High Blood Pressure Milton Culver MD   loratadine 10 MG capsule Take 10 mg by mouth daily. Hayfever Milton Culver MD   testosterone 40.5 MG/2.5GM (1.62%) GEL Place 1 packet (40.5 mg) onto the skin daily. Low testosterone Deo Still MD   vitamin C (ASCORBIC ACID) 1000 MG TABS Take 1,000 mg by mouth daily. General health Patient Reported   ZEPBOUND 2.5 MG/0.5ML prefilled pen Inject 2.5 mg subcutaneously every 7 days. OBESITY; Obstructive Sleep Apnea Milton Culver MD   zinc gluconate 50 MG tablet Take 50 mg by mouth daily. General health Patient Reported         Add new medications, over-the-counter drugs, herbals, vitamins, or  minerals in the blank rows below.    Medication How I take it Why I use it Prescriber                                      Allergies:      No Known Allergies        Side effects I have had:      No Known Side Effects        Other Information:              My notes and  questions:

## 2025-01-08 NOTE — PATIENT INSTRUCTIONS
Recommendations from today's MTM visit:                                                      - Continue taking bupropion 100 mg twice daily for now    - We have prescribed Zepbound for weight management -it is possible insurance might improve this with recent FDA approval for sleep apnea. Start with 2.5 mg injected once weekly if approved.     - Your next follow-up appointment is scheduled for Wednesday, March 19th, 2025, at 8:30 AM to assess the effectiveness of Zepbound and continue monitoring your overall health.    It was great to speak with you today.  I value your experience and would be very thankful for your time with providing feedback on our clinic survey. You may receive a survey via email or text message in the next few days.     To schedule another MTM appointment, please call the clinic directly or you may call the scheduling line at [phone].    My Clinical Pharmacist's contact information:                                                      Please contact the clinic with any questions or concerns you have.      Gladis Ulrich, Pharm.D., BCACP  Medication Therapy Management Pharmacist

## 2025-01-08 NOTE — PROGRESS NOTES
Medication Therapy Management (MTM) Encounter    ASSESSMENT:                            Medication Adherence/Access: No issues identified    Hypertension   Stable    Weight Management   Tolerating bupropion and has continued to lose weight  Zepbound is now FDA approved for treatment of sleep apnea, which she has been diagnosed with.  Zepbound may be more effective for weight loss and cardiorenal benefits if approved by insurance    Hyperlipidemia   Not at goal LDL less than 100 -  will monitor with recent/ongoing weight loss.  Addition of Zepbound would likely provide cardiovascular risk reduction     Hypothyroidism   Stable     Supplements   Stable      PLAN:                              - Continue taking bupropion 100 mg twice daily for now    - We have prescribed Zepbound for weight management -it is possible insurance might improve this with recent FDA approval for sleep apnea. Start with 2.5 mg injected once weekly if approved.     - Your next follow-up appointment is scheduled for Wednesday, March 19th, 2025, at 8:30 AM to assess the effectiveness of Zepbound and continue monitoring your overall health.      SUBJECTIVE/OBJECTIVE:                          Gene Jimenez is a 69 year old male seen for a follow-up visit.     Reason for visit: Medication review.    Allergies/ADRs: None  Past Medical History: Reviewed in chart  Tobacco: He reports that he has never smoked. He quit smokeless tobacco use about 45 years ago.  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  Home BP monitoring   113/73 last home check     Last Comprehensive Metabolic Panel:  Lab Results   Component Value Date     11/22/2024  "   POTASSIUM 3.8 11/22/2024    CHLORIDE 102 11/22/2024    CO2 23 11/22/2024    ANIONGAP 11 11/22/2024     (H) 11/22/2024    BUN 15.2 11/22/2024    CR 0.81 11/22/2024    GFRESTIMATED >90 11/22/2024    STEFAN 10.6 (H) 11/22/2024       Weight Management   Bupropion SR 100mg twice daily   He was able to be on Wegovy for 1 month, but insurance did not cover this long term. He did lose some weight and felt reduced appetite while taking. Notices improved joint pain carrying less weight around  We discussed Zepbound was recently FDA approved for sleep apnea, and he is open to seeing if insurance will cover this    Nutrition/Eating Habits: Diet has been better, no change to portion sizes. Is more aware of intake. Notes alcohol consumption is down to one or less per day  meat and potatos, not a lot of veggies. Loves food, loves to cook.    Exercise/Activity: 3 days per week at the gym and thinking of adding in swimming on the other days, active with woodworking and gardening (weather permitting).   Medications Tried/Failed:  None.   Medical History:  MEN2/Medullary Thyroid Cancer: none  Pancreatitis: none  Baseline GI symptomatology: none     Initial Consult Weight: 252 (9/24/24)     Current weight today: 250 lbs 9.6 oz 236lb per scale at gym  Cumulative Weight loss: -16lb, -6.3% from baseline    Wt Readings from Last 4 Encounters:   01/08/25 250 lb 9.6 oz (113.7 kg)   11/20/24 243 lb 9.6 oz (110.5 kg)   10/04/24 255 lb 3.2 oz (115.8 kg)     Estimated body mass index is 34.95 kg/m  as calculated from the following:    Height as of 10/4/24: 5' 11\" (1.803 m).    Weight as of this encounter: 250 lb 9.6 oz (113.7 kg).    Hyperlipidemia   Simvastatin 40mg daily  Patient reports no significant myalgias or other side effects.  The 10-year ASCVD risk score (Delia CULLEN, et al., 2019) is: 19.5%    Values used to calculate the score:      Age: 69 years      Sex: Male      Is Non- : No      Diabetic: No      " Tobacco smoker: No      Systolic Blood Pressure: 128 mmHg      Is BP treated: Yes      HDL Cholesterol: 39 mg/dL      Total Cholesterol: 183 mg/dL  Recent Labs   Lab Test 09/24/24  0840 06/05/23  1057   CHOL 183 178   HDL 39* 38*   * 102*   TRIG 182* 190*        Hypothyroidism   Levothyroxine 100 mcg daily.   Patient is having the following symptoms: none.   TSH   Date Value Ref Range Status   11/11/2024 1.41 0.30 - 4.20 uIU/mL Final   06/03/2022 1.25 0.30 - 5.00 uIU/mL Final   12/14/2021 1.36 0.40 - 4.00 mU/L Final        Supplements   Multivitamin daily  Vitamin C 1000mg 1-2 tablets daily  Vitamin D 2000IU 2 capsules daily  Zinc 30mg once daily  Fish Oil 1000mg 1-2 capsules daily  No reported issues at this time.            Today's Vitals: /80   Wt 250 lb 9.6 oz (113.7 kg)   BMI 34.95 kg/m    ----------------      I spent 30 minutes with this patient today. All changes were made via collaborative practice agreement with Milton Culver MD. A copy of the visit note was provided to the patient's provider(s).    A summary of these recommendations was given to the patient.    Gladis Ulrich, Pharm.D., [facility]  Medication Therapy Management Pharmacist  [phone]        Medication Therapy Recommendations  Obesity due to excess calories, unspecified obesity severity   1 Current Medication: ZEPBOUND 2.5 MG/0.5ML prefilled pen   Current Medication Sig: Inject 2.5 mg subcutaneously every 7 days.   Rationale: Synergistic therapy - Needs additional medication therapy - Indication   Recommendation: Start Medication   Status: Accepted per CPA   Identified Date: 1/8/2025 Completed Date: 1/8/2025

## 2025-01-08 NOTE — LETTER
"Recommended To-Do List      Prepared on: Jan 8, 2025       You can get the best results from your medications by completing the items on this \"To-Do List.\"      Bring your To-Do List when you go to your doctor. And, share it with your family or caregivers.    My To-Do List:  What we talked about: What I should do:   A new medication that may be of benefit to you    Start taking Zepbound 2.5mg injected once weekly if covered by insurance          What we talked about: What I should do:                     "

## 2025-02-02 DIAGNOSIS — R79.89 LOW TESTOSTERONE: ICD-10-CM

## 2025-02-05 RX ORDER — TESTOSTERONE 40.5 MG/2.5G
40.5 GEL TOPICAL DAILY
Qty: 90 PACKET | Refills: 0 | Status: SHIPPED | OUTPATIENT
Start: 2025-02-05

## 2025-02-05 NOTE — TELEPHONE ENCOUNTER
Last Written Prescription:  testosterone 40.5 MG/2.5GM (1.62%) GEL 90 packet 0 12/2/2024 -- No   Sig - Route: Place 1 packet (40.5 mg) onto the skin daily. - Transdermal     ----------------------  Last Visit Date: 7-2-24  Future Visit Date: 7-7-25    Refill decision: Medication unable to be refilled by RN due to:       [x]    Controlled medication      Request from pharmacy:  Requested Prescriptions   Pending Prescriptions Disp Refills    testosterone 40.5 MG/2.5GM (1.62%) GEL 90 packet 0     Sig: Place 1 packet (40.5 mg) onto the skin daily.       Androgen Agents Failed - 2/5/2025  7:08 AM        Failed - Refills for this classification require provider review        Failed - Recent (6 mo) or future (30 days) visit within the authorizing provider's specialty        Passed - Patient is of age 12 and older        Passed - Lipid panel on file in past 12 mos     Recent Labs   Lab Test 09/24/24  0840   CHOL 183   TRIG 182*   HDL 39*   *   NHDL 144*               Passed - ALT on file within past 12 mos     Recent Labs   Lab Test 09/24/24  0840   ALT 42             Passed - Medication is active on med list        Passed - HCT less than 54% on file within past 12 mos     Recent Labs   Lab Test 01/06/25  0748   HCT 52.5             Passed - Serum Testosterone on file within past 12 mos     Recent Labs   Lab Test 01/06/25  0748   TESTOSTTOTAL 177*             Passed - Serum PSA on file within past 12 mos     Lab Results   Component Value Date    PSA 3.72 01/06/2025    PSA 3.37 06/03/2022             Passed - Most recent blood pressure under 140/90 in past 6 months     BP Readings from Last 3 Encounters:   01/08/25 128/80   10/04/24 (!) 160/80       No data recorded            Passed - Patient is not pregnant        Passed - No positive pregnancy test on file within past 12 mos        Passed - AST on file within past 12 mos     Recent Labs   Lab Test 09/24/24  0840   AST 27

## 2025-02-11 ENCOUNTER — TELEPHONE (OUTPATIENT)
Dept: ENDOCRINOLOGY | Facility: CLINIC | Age: 70
End: 2025-02-11
Payer: COMMERCIAL

## 2025-02-11 NOTE — TELEPHONE ENCOUNTER
Retail Pharmacy Prior Authorization Team   Phone: [phone]    PA Initiation    Medication: TESTOSTERONE 40.5 MG/2.5GM (1.62%) TD GEL  Insurance Company: Other (see comments)Comment:  [employer] [phone]  Pharmacy Filling the Rx: [facility] [place], [place] - [address], [address]  Filling Pharmacy Phone: [phone]  Filling Pharmacy Fax:    Start Date: 2/11/2025

## 2025-02-11 NOTE — TELEPHONE ENCOUNTER
"Received the following [employer] message from patient:    \"I changed insurance plans to [facility] and now they are requesting a letter from you for an exception. I have been out of this Rx for 3 days and just received notification today. Their contact info is:  Clinical Review Department   [address] [place] [address]   Fax: [phone]   Phone:  [phone]   Please call me if you have any questions at [phone]\"        Will send to Prior Authorization team to review and complete PA for testosterone.        Laura Cage, RN  Endocrine Care Coordinator  [facility]    "

## 2025-02-11 NOTE — TELEPHONE ENCOUNTER
Note: Due to record-high volumes, our turn-around time is taking longer than usual . We are currently 3-4  business days behind in the pools.   We are working diligently to submit all requests in a timely manner and in the order they are received. Please only flag TRUE URGENT requests as high priority to the pool at this time.   If you have questions on status of PA's,  please send a note/message in the active PA encounter and send back to the [facility] PA pool [[id number]].    If you have questions about the turn-around time or about our process, please reach out to our supervisor Kajal Ramirez.   Thank you!   RPPA (Retail Pharmacy Prior Authorization) team      Prior Authorization Approval    Authorization Effective Date: 1/1/2025  Authorization Expiration Date: 2/11/2026  Medication: Testosterone -APPROVED  Reference #:     Insurance Company: Other (see comments)Comment:  [employer] [phone]  Which Pharmacy is filling the prescription (Not needed for infusion/clinic administered): [facility] - [place], [place] - [address], [address]  Pharmacy Notified: Yes  Patient Notified: Instructed pharmacy to notify patient when script is ready to /ship.

## 2025-03-15 ENCOUNTER — HEALTH MAINTENANCE LETTER (OUTPATIENT)
Age: 70
End: 2025-03-15

## 2025-03-18 NOTE — PROGRESS NOTES
"Medication Therapy Management (MTM) Encounter    ASSESSMENT:                            Medication Adherence/Access: No issues identified      Acute Back Pain:    Reviewed with Red Rosario PA-C. 5-day supply of cyclobenzaprine approved per verbal order.    Hypertension   BP at goal <140/90. Hydrochlorothiazide may be contributing to ED, which is impacting his quality of life. Will discontinue hydrochlorothiazide and increase the dose of Lisinopril. May need to consider alternative medication in the future such as amlodipine or beta blocker.    Weight Management/STAN  Dietary changes have been helpful, cost of Zepbound has been prohibitive so far and may be an option when medication deductible has been met      PLAN:                            1. Medications:     - Increase Lisinopril to 40 mg daily     - Discontinue Hydrochlorothiazide      - Cyclobenzaprine (Flexeril) 10mg three times daily for back pain, as needed         2. Laboratory Tests and Monitoring:     - Scheduled basic metabolic panel: April 22nd at 8:15am     - Continue monitoring blood pressure at home    3. Lifestyle Adjustments:     - Continue with dietary changes for weight management     - Consider starting Zepbound in 1-2 months for weight loss    4. Follow-Up: April 30th at 8:30am      SUBJECTIVE/OBJECTIVE:                          Gene Jimenez is a 69 year old male seen for a follow-up visit.     Reason for visit: Medication review.    Allergies/ADRs: None  Past Medical History: Reviewed in chart  Tobacco: He reports that he has never smoked. He quit smokeless tobacco use about 45 years ago.  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 Pain described as \"brutal\". " Has been using ice for relief.  He currently takes celecoxib for knee pain and states that this has not touched the back pain.  His sleep has been disrupted the last few nights      Hypertension   Lisinopril 30mg daily  Hydrochlorothiazide 25mg daily  Patient reports no current medication side effects  the following medication side effects: issues with libido and erectile dysfunction. Sildenafil has been ineffective, with no erection or ejaculation.   Patient self monitors blood pressure.  Home BP monitoring   114/71 last home check     Last Comprehensive Metabolic Panel:  Lab Results   Component Value Date     11/22/2024    POTASSIUM 3.8 11/22/2024    CHLORIDE 102 11/22/2024    CO2 23 11/22/2024    ANIONGAP 11 11/22/2024     (H) 11/22/2024    BUN 15.2 11/22/2024    CR 0.81 11/22/2024    GFRESTIMATED >90 11/22/2024    STEFAN 10.6 (H) 11/22/2024       Weight Management /STAN  Bupropion SR 100mg twice daily   Zepbound 2.5mg injected once weekly - PA approved after last visit for STAN, has not started yet. He hasn't met his drug formulary, and the cost was $600 for the first month. He thinks he may pay the cost next month.  He was able to be on Wegovy for 1 month, but insurance did not cover this long term. He did lose some weight and felt reduced appetite while taking. Notices improved joint pain carrying less weight around    Nutrition/Eating Habits: Diet has been better, no change to portion sizes. Is more aware of intake. Notes alcohol consumption is down to one or less per day  meat and potatos, not a lot of veggies. Loves food, loves to cook.    Exercise/Activity: 3 days per week at the gym and thinking of adding in swimming on the other days, active with woodworking and gardening (weather permitting).   Medications Tried/Failed:  None.   Medical History:  MEN2/Medullary Thyroid Cancer: none  Pancreatitis: none  Baseline GI symptomatology: none     Initial Consult Weight: 252 (9/24/24)     Current weight  "today: 245 lbs 12.8 oz   Cumulative Weight loss: -7lb, -2.7% from baseline    Wt Readings from Last 4 Encounters:   03/19/25 245 lb 12.8 oz (111.5 kg)   01/08/25 250 lb 9.6 oz (113.7 kg)   11/20/24 243 lb 9.6 oz (110.5 kg)   10/04/24 255 lb 3.2 oz (115.8 kg)     Estimated body mass index is 34.28 kg/m  as calculated from the following:    Height as of 10/4/24: 5' 11\" (1.803 m).    Weight as of this encounter: 245 lb 12.8 oz (111.5 kg).    Today's Vitals: /80   Wt 245 lb 12.8 oz (111.5 kg)   BMI 34.28 kg/m    ----------------      I spent 30 minutes with this patient today. All changes were made via collaborative practice agreement with Milton Culver MD. A copy of the visit note was provided to the patient's provider(s).    A summary of these recommendations was given to the patient.    Gladis Ulrich, Pharm.D., [facility]  Medication Therapy Management Pharmacist  [phone]        Medication Therapy Recommendations  Acute low back pain without sciatica, unspecified back pain laterality   1 Current Medication: cyclobenzaprine (FLEXERIL) 10 MG tablet   Current Medication Sig: Take 10 mg by mouth 3 times daily as needed for muscle spasms.   Rationale: Untreated condition - Needs additional medication therapy - Indication   Recommendation: Start Medication   Status: Accepted per Provider   Identified Date: 3/19/2025 Completed Date: 3/19/2025         Hypertension, essential   1 Current Medication: hydrochlorothiazide (HYDRODIURIL) 25 MG tablet (Discontinued)   Current Medication Sig: Take 25 mg by mouth daily.   Rationale: Undesirable effect - Adverse medication event - Safety   Recommendation: Discontinue Medication   Status: Accepted per CPA   Identified Date: 3/19/2025 Completed Date: 3/19/2025         2 Current Medication: lisinopril (ZESTRIL) 10 MG tablet (Discontinued)   Current Medication Sig: Take 30 mg by mouth daily.   Rationale: Dose too low - Dosage too low - Effectiveness   Recommendation: " Increase Dose   Status: Accepted per CPA   Identified Date: 3/19/2025 Completed Date: 3/19/2025

## 2025-03-19 ENCOUNTER — OFFICE VISIT (OUTPATIENT)
Dept: PHARMACY | Facility: PHYSICIAN GROUP | Age: 70
End: 2025-03-19
Payer: COMMERCIAL

## 2025-03-19 VITALS — WEIGHT: 245.8 LBS | DIASTOLIC BLOOD PRESSURE: 80 MMHG | SYSTOLIC BLOOD PRESSURE: 122 MMHG | BODY MASS INDEX: 34.28 KG/M2

## 2025-03-19 DIAGNOSIS — M54.50 ACUTE LOW BACK PAIN WITHOUT SCIATICA, UNSPECIFIED BACK PAIN LATERALITY: ICD-10-CM

## 2025-03-19 DIAGNOSIS — I10 HYPERTENSION, ESSENTIAL: Primary | ICD-10-CM

## 2025-03-19 DIAGNOSIS — E66.09 OBESITY DUE TO EXCESS CALORIES, UNSPECIFIED OBESITY SEVERITY: ICD-10-CM

## 2025-03-19 PROCEDURE — 3074F SYST BP LT 130 MM HG: CPT | Performed by: PHARMACIST

## 2025-03-19 PROCEDURE — 3079F DIAST BP 80-89 MM HG: CPT | Performed by: PHARMACIST

## 2025-03-19 PROCEDURE — 99606 MTMS BY PHARM EST 15 MIN: CPT | Performed by: PHARMACIST

## 2025-03-19 PROCEDURE — 99607 MTMS BY PHARM ADDL 15 MIN: CPT | Performed by: PHARMACIST

## 2025-03-19 RX ORDER — CYCLOBENZAPRINE HCL 10 MG
10 TABLET ORAL 3 TIMES DAILY PRN
COMMUNITY
Start: 2025-03-19 | End: 2025-03-24

## 2025-03-19 RX ORDER — LISINOPRIL 40 MG/1
40 TABLET ORAL DAILY
COMMUNITY

## 2025-03-19 NOTE — PATIENT INSTRUCTIONS
Recommendations from today's MTM visit:                                                      1. Medications:     - Increase Lisinopril to 40 mg     - Discontinue Hydrochlorothiazide      - Cyclobenzaprine (Flexeril) 10mg three times daily for back pain, as needed         2. Laboratory Tests and Monitoring:     - Schedule basic metabolic panel: April 22nd at 8:15am     - Continue monitoring blood pressure at home    3. Lifestyle Adjustments:     - Continue with dietary changes for weight management     - Consider starting Zepbound in 1-2 months for weight loss    4. Follow-Up: April 30th at 8:30am    It was great to speak with you today.  I value your experience and would be very thankful for your time with providing feedback on our clinic survey. You may receive a survey via email or text message in the next few days.     To schedule another MTM appointment, please call the clinic directly or you may call the scheduling line at [phone].    My Clinical Pharmacist's contact information:                                                      Please contact the clinic with any questions or concerns you have.      Gladis Ulrich, Pharm.D., BCACP  Medication Therapy Management Pharmacist

## 2025-04-22 ENCOUNTER — LAB (OUTPATIENT)
Dept: LAB | Facility: CLINIC | Age: 70
End: 2025-04-22
Payer: COMMERCIAL

## 2025-04-22 DIAGNOSIS — I10 HTN (HYPERTENSION): Primary | ICD-10-CM

## 2025-04-22 PROCEDURE — 80048 BASIC METABOLIC PNL TOTAL CA: CPT

## 2025-04-22 PROCEDURE — 36415 COLL VENOUS BLD VENIPUNCTURE: CPT

## 2025-04-23 LAB
ANION GAP SERPL CALCULATED.3IONS-SCNC: 6 MMOL/L (ref 7–15)
BUN SERPL-MCNC: 16.5 MG/DL (ref 8–23)
CALCIUM SERPL-MCNC: 11 MG/DL (ref 8.8–10.4)
CHLORIDE SERPL-SCNC: 104 MMOL/L (ref 98–107)
CREAT SERPL-MCNC: 0.98 MG/DL (ref 0.67–1.17)
EGFRCR SERPLBLD CKD-EPI 2021: 84 ML/MIN/1.73M2
GLUCOSE SERPL-MCNC: 89 MG/DL (ref 70–99)
HCO3 SERPL-SCNC: 28 MMOL/L (ref 22–29)
POTASSIUM SERPL-SCNC: 4.4 MMOL/L (ref 3.4–5.3)
SODIUM SERPL-SCNC: 138 MMOL/L (ref 135–145)

## 2025-04-29 NOTE — PROGRESS NOTES
Medication Therapy Management (MTM) Encounter    ASSESSMENT:                            Medication Adherence/Access: No issues identified    Hypertension   Stable. Discussed possible addition of amlodipine to get BP <130/80mmHg, however we are going to weight until after he has established on Zepbound and monitor if weight loss helps reduce BP.    Weight Management /STAN  He would benefit from initiation of Zepbound, which is more accessible at this time closer to hitting his deductible.    Hyperlipidemia   Stable. He may benefit from switching to a more potent statin -- he will plan to discuss with cardiology    Supplements   Stable. Agree to stay off Vitamin D this time of year and not currently needing doses >800IU.      PLAN:                            1. Medications:     - Start Zepbound 2.5mg injected once weekly. This medication decreases appetite, and slows how you absorb sugar into your blood.  It is generally very effective in lowering blood sugar and helping with weight loss. There are some heart and kidney protective benefits long-term. Side effects we monitor for include nausea, vomiting, bloating, constipation or diarrhea, and abdominal pain.    2. Lifestyle Changes:  - Stay hydrated to avoid volume depletion  - Continue regular exercise  - Limit high-calcium foods in your diet  - Keep monitoring your blood pressure, especially in the evenings  - Try the local [facility] for nutrition advice  - Keep your salt intake low and use other seasonings    Follow-up:  - Next appointment on May 28th at 1:30 PM      SUBJECTIVE/OBJECTIVE:                          Gene Jimenez is a 69 year old male seen for a follow-up visit.     Reason for visit: Medication review.    Allergies/ADRs: None  Past Medical History: Reviewed in chart  Tobacco: He reports that he has never smoked. He quit smokeless tobacco use about 45 years ago.  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  Home BP monitoring   140/91 last night, month avg 140/86  Medication History:  Hydrochlorothiazide 25mg daily - discontinued 3/19 d/t ED side effects which improved off of medication       Last Comprehensive Metabolic Panel:  Lab Results   Component Value Date     04/22/2025    POTASSIUM 4.4 04/22/2025    CHLORIDE 104 04/22/2025    CO2 28 04/22/2025    ANIONGAP 6 (L) 04/22/2025    GLC 89 04/22/2025    BUN 16.5 04/22/2025    CR 0.98 04/22/2025    GFRESTIMATED 84 04/22/2025    STEFAN 11.0 (H) 04/22/2025       Weight Management /STAN  Bupropion SR 100mg twice daily   Zepbound 2.5mg injected once weekly - [place] approved after last visit for STAN, has not started yet. He hasn't met his drug formulary, and the cost was $600 for the first month. He is planning on starting Zepbound this month..  He was able to be on Wegovy for 1 month, but insurance did not cover this long term. He did lose some weight and felt reduced appetite while taking. Notices improved joint pain carrying less weight around    Nutrition/Eating Habits: Diet has been better, no change to portion sizes. Is more aware of intake. Notes alcohol consumption is down to one or less per day  meat and potatos, not a lot of veggies. Loves food, loves to cook.    Exercise/Activity: 3 days per week at the gym and thinking of adding in swimming on the other days, active with woodworking and gardening (weather permitting). Also thinking of meeting with the nutritionist at the gym to dial in his diet.  Medications Tried/Failed:  None.   Medical History:  MEN2/Medullary Thyroid Cancer: none  Pancreatitis: none  Baseline GI symptomatology: none     Initial Consult Weight: 252 (9/24/24)   Current weight today: 249 lbs 0 oz  "  Cumulative Weight loss: -3lb, -1.1% from baseline    Wt Readings from Last 4 Encounters:   04/30/25 249 lb (112.9 kg)   03/19/25 245 lb 12.8 oz (111.5 kg)   01/08/25 250 lb 9.6 oz (113.7 kg)   11/20/24 243 lb 9.6 oz (110.5 kg)     Estimated body mass index is 34.73 kg/m  as calculated from the following:    Height as of this encounter: 5' 11\" (1.803 m).    Weight as of this encounter: 249 lb (112.9 kg).    Hyperlipidemia   Simvastatin 40mg daily  Patient reports no significant myalgias or other side effects.  Was recently referred to cardiology, setting up appointment  The 10-year ASCVD risk score (Delia CULLEN, et al., 2019) is: 19.5%    Values used to calculate the score:      Age: 69 years      Sex: Male      Is Non- : No      Diabetic: No      Tobacco smoker: No      Systolic Blood Pressure: 128 mmHg      Is BP treated: Yes      HDL Cholesterol: 39 mg/dL      Total Cholesterol: 183 mg/dL  Recent Labs   Lab Test 09/24/24  0840 06/05/23  1057   CHOL 183 178   HDL 39* 38*   * 102*   TRIG 182* 190*        Supplements   Multivitamin daily  Vitamin C 1000mg 1-2 tablets daily  Vitamin D 2000IU 2 capsules daily -- on hold currently, stopped 1-2 weeks ago after BMP and elevated Ca level  Zinc 30mg once daily  Fish Oil 1000mg 1-2 capsules daily  No reported issues at this time.            Today's Vitals: /84   Ht 5' 11\" (1.803 m)   Wt 249 lb (112.9 kg)   BMI 34.73 kg/m    ----------------      I spent 30 minutes with this patient today. All changes were made via collaborative practice agreement with Milton Culver MD. A copy of the visit note was provided to the patient's provider(s).    A summary of these recommendations was given to the patient.    Gladis Ulrich, Pharm.D., BCACP  Medication Therapy Management Pharmacist  [phone]        Medication Therapy Recommendations  STAN (obstructive sleep apnea)   1 Current Medication: ZEPBOUND 2.5 MG/0.5ML prefilled pen   Current " Medication Sig: Inject 2.5 mg subcutaneously every 7 days.   Rationale: Untreated condition - Needs additional medication therapy - Indication   Recommendation: Provide Adherence Intervention   Status: Accepted - no CPA Needed   Identified Date: 4/30/2025 Completed Date: 4/30/2025

## 2025-04-30 ENCOUNTER — OFFICE VISIT (OUTPATIENT)
Dept: PHARMACY | Facility: PHYSICIAN GROUP | Age: 70
End: 2025-04-30
Payer: COMMERCIAL

## 2025-04-30 VITALS
BODY MASS INDEX: 34.86 KG/M2 | HEIGHT: 71 IN | SYSTOLIC BLOOD PRESSURE: 132 MMHG | WEIGHT: 249 LBS | DIASTOLIC BLOOD PRESSURE: 84 MMHG

## 2025-04-30 DIAGNOSIS — E66.09 OBESITY DUE TO EXCESS CALORIES, UNSPECIFIED OBESITY SEVERITY: ICD-10-CM

## 2025-04-30 DIAGNOSIS — G47.33 OSA (OBSTRUCTIVE SLEEP APNEA): ICD-10-CM

## 2025-04-30 DIAGNOSIS — Z78.9 TAKES DIETARY SUPPLEMENTS: ICD-10-CM

## 2025-04-30 DIAGNOSIS — E78.2 MIXED HYPERLIPIDEMIA: ICD-10-CM

## 2025-04-30 DIAGNOSIS — I10 HYPERTENSION, ESSENTIAL: Primary | ICD-10-CM

## 2025-04-30 PROCEDURE — 3075F SYST BP GE 130 - 139MM HG: CPT | Performed by: PHARMACIST

## 2025-04-30 PROCEDURE — 3079F DIAST BP 80-89 MM HG: CPT | Performed by: PHARMACIST

## 2025-04-30 PROCEDURE — 99607 MTMS BY PHARM ADDL 15 MIN: CPT | Performed by: PHARMACIST

## 2025-04-30 PROCEDURE — 99606 MTMS BY PHARM EST 15 MIN: CPT | Performed by: PHARMACIST

## 2025-04-30 RX ORDER — CELECOXIB 100 MG/1
100 CAPSULE ORAL DAILY
COMMUNITY
End: 2025-05-01

## 2025-04-30 NOTE — PATIENT INSTRUCTIONS
Recommendations from today's MTM visit:                                                      ASSESSMENT:                            Medication Adherence/Access: No issues identified    Hypertension   Stable. Discussed possible addition of amlodipine to get BP <130/80mmHg, however we are going to weight until after he has established on Zepbound and monitor if weight loss helps reduce BP.    Weight Management /STNA  He would benefit from initiation of Zepbound, which is more accessible at this time closer to hitting his deductible.    Hyperlipidemia   Stable. He may benefit from switching to a more potent statin -- he will plan to discuss with cardiology    Supplements   Stable. Agree to stay off Vitamin D this time of year and not currently needing doses >800IU.      PLAN:                            1. Medications:     - Start Zepbound 2.5mg injected once weekly. This medication decreases appetite, and slows how you absorb sugar into your blood.  It is generally very effective in lowering blood sugar and helping with weight loss. There are some heart and kidney protective benefits long-term. Side effects we monitor for include nausea, vomiting, bloating, constipation or diarrhea, and abdominal pain.    2. Lifestyle Changes:  - Stay hydrated to avoid volume depletion  - Continue regular exercise  - Limit high-calcium foods in your diet  - Keep monitoring your blood pressure, especially in the evenings  - Try the local [facility] for nutrition advice  - Keep your salt intake low and use other seasonings    Follow-up:  - Next appointment on May 28th at 1:30 PM    It was great to speak with you today.  I value your experience and would be very thankful for your time with providing feedback on our clinic survey. You may receive a survey via email or text message in the next few days.     To schedule another MT appointment, please call the clinic directly or you may call the scheduling line at [phone].    My Clinical  Pharmacist's contact information:                                                      Please contact the clinic with any questions or concerns you have.      Gladis Ulrich Pharm.D., [facility]  Medication Therapy Management Pharmacist

## 2025-05-27 NOTE — PROGRESS NOTES
Medication Therapy Management (MTM) Encounter    ASSESSMENT:                            Medication Adherence/Access: No issues identified    Hypertension   Stable. Coming down per home readings.    Weight Management /STAN  He would benefit from titration of Zepbound, which is more accessible at this time closer to hitting his deductible.    PLAN:                            1. Medications:     - Finish your current supply of Zepbound 2.5mg injected once weekly, then increase to Zepbound 5mg injected once weekly.     2. Keep up the great work with your diet and exercise changes!    Follow-up: Tuesday, July 1st at 10:00 AM      SUBJECTIVE/OBJECTIVE:                          Gene Jimenez is a 69 year old male seen for a follow-up visit. Following with Dr. Culver currently, and will be switching to Dr. Diaz after this week    Reason for visit: Medication review.    Allergies/ADRs: None  Past Medical History: Reviewed in chart  Tobacco: He reports that he has never smoked. He quit smokeless tobacco use about 45 years ago.  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  Home BP monitoring  120/74, month avg 137/84 improved from 140/86  Medication History:  Hydrochlorothiazide 25mg daily - discontinued 3/19 d/t ED side effects which improved off of medication    BP Readings from Last 3 Encounters:   05/28/25 (!) 140/80   04/30/25 132/84   03/19/25 122/80          Last Comprehensive Metabolic Panel:  Lab Results   Component Value Date     04/22/2025    POTASSIUM 4.4 04/22/2025    CHLORIDE 104 04/22/2025    CO2 28 04/22/2025    ANIONGAP 6 (L) 04/22/2025    GLC 89 04/22/2025    BUN 16.5 04/22/2025    CR 0.98 04/22/2025    GFRESTIMATED 84 04/22/2025    STEFAN  "11.0 (H) 04/22/2025       Weight Management /STAN  Bupropion SR 100mg twice daily   Zepbound 2.5mg injected once weekly - started after last visit and has taken 2 doses so far, no side effects noted    Nutrition/Eating Habits: Diet has been better, actively working on making healthy choices. No longer having ice cream before bed every night. Has reduced alcohol from 1-2/day to 1-2/week.   Exercise/Activity: 3 days per week at the gym and thinking of adding in swimming on the other days, active with woodworking and gardening (weather permitting). Tracks diet and exercise on an eleonora on his phone  Medications Tried/Failed:  None.   Medical History:  MEN2/Medullary Thyroid Cancer: none  Pancreatitis: none  Baseline GI symptomatology: none     Initial Consult Weight: 252 (9/24/24)   Current weight today: 232 lbs 6.4 oz   Cumulative Weight loss: -20b, -7.9% from baseline    Wt Readings from Last 4 Encounters:   05/28/25 232 lb 6.4 oz (105.4 kg)   04/30/25 249 lb (112.9 kg)   03/19/25 245 lb 12.8 oz (111.5 kg)   01/08/25 250 lb 9.6 oz (113.7 kg)     Estimated body mass index is 32.41 kg/m  as calculated from the following:    Height as of 4/30/25: 5' 11\" (1.803 m).    Weight as of this encounter: 232 lb 6.4 oz (105.4 kg).      Today's Vitals: BP (!) 140/80   Wt 232 lb 6.4 oz (105.4 kg)   BMI 32.41 kg/m    ----------------      I spent 15 minutes with this patient today. All changes were made via collaborative practice agreement with Milton Culver MD. A copy of the visit note was provided to the patient's provider(s).    A summary of these recommendations was given to the patient.    Gladis Ulrich, Pharm.D., [facility]  Medication Therapy Management Pharmacist  [phone]        Medication Therapy Recommendations  STAN (obstructive sleep apnea)   1 Current Medication: ZEPBOUND 2.5 MG/0.5ML prefilled pen (Discontinued)   Current Medication Sig: Inject 2.5 mg subcutaneously every 7 days.   Rationale: Dose too low - Dosage " too low - Effectiveness   Recommendation: Increase Dose - Zepbound 5 MG/0.5ML Soaj   Status: Accepted per CPA   Identified Date: 5/28/2025 Completed Date: 5/28/2025

## 2025-05-28 ENCOUNTER — OFFICE VISIT (OUTPATIENT)
Dept: PHARMACY | Facility: PHYSICIAN GROUP | Age: 70
End: 2025-05-28
Payer: COMMERCIAL

## 2025-05-28 VITALS — DIASTOLIC BLOOD PRESSURE: 80 MMHG | WEIGHT: 232.4 LBS | SYSTOLIC BLOOD PRESSURE: 140 MMHG | BODY MASS INDEX: 32.41 KG/M2

## 2025-05-28 DIAGNOSIS — I10 HYPERTENSION, ESSENTIAL: Primary | ICD-10-CM

## 2025-05-28 DIAGNOSIS — G47.33 OSA (OBSTRUCTIVE SLEEP APNEA): ICD-10-CM

## 2025-05-28 DIAGNOSIS — E66.09 OBESITY DUE TO EXCESS CALORIES, UNSPECIFIED OBESITY SEVERITY: ICD-10-CM

## 2025-05-28 PROCEDURE — 3077F SYST BP >= 140 MM HG: CPT | Performed by: PHARMACIST

## 2025-05-28 PROCEDURE — 99606 MTMS BY PHARM EST 15 MIN: CPT | Performed by: PHARMACIST

## 2025-05-28 PROCEDURE — 3079F DIAST BP 80-89 MM HG: CPT | Performed by: PHARMACIST

## 2025-05-28 NOTE — PATIENT INSTRUCTIONS
Recommendations from today's MTM visit:                                                      ASSESSMENT:                            Medication Adherence/Access: No issues identified    Hypertension   Stable. Coming down per home readings.    Weight Management /STAN  He would benefit from titration of Zepbound, which is more accessible at this time closer to hitting his deductible.    PLAN:                            1. Medications:     - Finish your current supply of Zepbound 2.5mg injected once weekly, then increase to Zepbound 5mg injected once weekly.     2. Keep up the great work with your diet and exercise changes!    Follow-up: Tuesday, July 1st at 10:00 AM    It was great to speak with you today.  I value your experience and would be very thankful for your time with providing feedback on our clinic survey. You may receive a survey via email or text message in the next few days.     To schedule another MTM appointment, please call the clinic directly or you may call the scheduling line at [phone].    My Clinical Pharmacist's contact information:                                                      Please contact the clinic with any questions or concerns you have.      Gladis Ulrich, Pharm.D., BCACP  Medication Therapy Management Pharmacist

## 2025-05-31 ENCOUNTER — MYC REFILL (OUTPATIENT)
Dept: ENDOCRINOLOGY | Facility: CLINIC | Age: 70
End: 2025-05-31
Payer: COMMERCIAL

## 2025-05-31 DIAGNOSIS — R79.89 LOW TESTOSTERONE: ICD-10-CM

## 2025-06-05 RX ORDER — TESTOSTERONE 40.5 MG/2.5G
40.5 GEL TOPICAL DAILY
Qty: 90 PACKET | Refills: 0 | Status: SHIPPED | OUTPATIENT
Start: 2025-06-05

## 2025-06-25 NOTE — PROGRESS NOTES
Medication Therapy Management (MTM) Encounter    ASSESSMENT:                            Medication Adherence/Access: No issues identified    Hypertension   Stable. Coming down per home readings.    Weight Management /STAN  He would benefit from titration of Zepbound for further weight loss and cardiorenal benefits    PLAN:                            1. Medications:     - Finish your current supply of Zepbound 5mg injected once weekly, then increase to Zepbound 7.5mg injected once weekly.     2. Keep going with the work with your diet and exercise changes!    Follow-up: Tuesday, July 29th at 10:30 AM      SUBJECTIVE/OBJECTIVE:                          Gene Jimenez is a 69 year old male seen for a follow-up visit.     Reason for visit: Medication review.    Allergies/ADRs: None  Past Medical History: Reviewed in chart  Tobacco: He reports that he has never smoked. He quit smokeless tobacco use about 45 years ago.  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  Home BP monitoring  126/77, 128/79 improved from 140/86  Medication History:  Hydrochlorothiazide 25mg daily - discontinued 3/19 d/t ED side effects which improved off of medication    BP Readings from Last 3 Encounters:   07/01/25 122/88   05/28/25 (!) 140/80   04/30/25 132/84          Weight Management /STAN  Bupropion SR 100mg twice daily   Zepbound 5mg injected once weekly - increased after last visit and has taken 3-4 doses so far, no side effects noted    Nutrition/Eating Habits: Diet has been better, actively working on making healthy choices, but notes he has been less strict than previous in the last few weeks. Has reduced alcohol from 1-2/day to 1-2/week.   Exercise/Activity: Hasn't been  "going to gym as much recently due to working on a house project  Generally does 3 days per week at the gym and thinking of adding in swimming on the other days, active with woodworking and gardening (weather permitting). Tracks diet and exercise on an eleonora on his phone  Medications Tried/Failed:  None.   Medical History:  MEN2/Medullary Thyroid Cancer: none  Pancreatitis: none  Baseline GI symptomatology: none     Initial Consult Weight: 252 (9/24/24)   Current weight today: 231 lbs 0 oz   Cumulative Weight loss: -21b, -7.9% from baseline    Wt Readings from Last 4 Encounters:   07/01/25 231 lb (104.8 kg)   05/28/25 232 lb 6.4 oz (105.4 kg)   04/30/25 249 lb (112.9 kg)   03/19/25 245 lb 12.8 oz (111.5 kg)     Estimated body mass index is 32.22 kg/m  as calculated from the following:    Height as of 4/30/25: 5' 11\" (1.803 m).    Weight as of this encounter: 231 lb (104.8 kg).      Today's Vitals: /88   Wt 231 lb (104.8 kg)   BMI 32.22 kg/m    ----------------      I spent 30minutes with this patient today. All changes were made via collaborative practice agreement with Joseph Diaz. A copy of the visit note was provided to the patient's provider(s).    A summary of these recommendations was given to the patient.    Gladis Ulrich, Pharm.D., [facility]  Medication Therapy Management Pharmacist  [phone]        Medication Therapy Recommendations  STAN (obstructive sleep apnea)   1 Current Medication: tirzepatide-Weight Management (ZEPBOUND) 5 MG/0.5ML prefilled pen (Discontinued)   Current Medication Sig: Inject 5 mg subcutaneously every 7 days.   Rationale: Dose too low - Dosage too low - Effectiveness   Recommendation: Increase Dose - Zepbound 7.5 MG/0.5ML Soaj   Status: Accepted per CPA   Identified Date: 7/1/2025 Completed Date: 7/1/2025                          "

## 2025-07-01 ENCOUNTER — OFFICE VISIT (OUTPATIENT)
Dept: PHARMACY | Facility: PHYSICIAN GROUP | Age: 70
End: 2025-07-01
Payer: COMMERCIAL

## 2025-07-01 VITALS — BODY MASS INDEX: 32.22 KG/M2 | WEIGHT: 231 LBS | DIASTOLIC BLOOD PRESSURE: 88 MMHG | SYSTOLIC BLOOD PRESSURE: 122 MMHG

## 2025-07-01 DIAGNOSIS — I10 HYPERTENSION, ESSENTIAL: Primary | ICD-10-CM

## 2025-07-01 DIAGNOSIS — G47.33 OSA (OBSTRUCTIVE SLEEP APNEA): ICD-10-CM

## 2025-07-01 DIAGNOSIS — E66.09 OBESITY DUE TO EXCESS CALORIES, UNSPECIFIED OBESITY SEVERITY: ICD-10-CM

## 2025-07-01 PROCEDURE — 99606 MTMS BY PHARM EST 15 MIN: CPT | Performed by: PHARMACIST

## 2025-07-01 PROCEDURE — 99607 MTMS BY PHARM ADDL 15 MIN: CPT | Performed by: PHARMACIST

## 2025-07-01 PROCEDURE — 3074F SYST BP LT 130 MM HG: CPT | Performed by: PHARMACIST

## 2025-07-01 PROCEDURE — 3079F DIAST BP 80-89 MM HG: CPT | Performed by: PHARMACIST

## 2025-07-01 RX ORDER — TIRZEPATIDE 7.5 MG/.5ML
7.5 INJECTION, SOLUTION SUBCUTANEOUS
COMMUNITY

## 2025-07-01 NOTE — PATIENT INSTRUCTIONS
Recommendations from today's MTM visit:                                                      ASSESSMENT:                            Medication Adherence/Access: No issues identified    Hypertension   Stable. Coming down per home readings.    Weight Management /STAN  He would benefit from titration of Zepbound for further weight loss and cardiorenal benefits    PLAN:                            1. Medications:     - Finish your current supply of Zepbound 5mg injected once weekly, then increase to Zepbound 7.5mg injected once weekly.     2. Keep going with the work with your diet and exercise changes!    Follow-up: Tuesday, July 29th at 10:30 AM    It was great to speak with you today.  I value your experience and would be very thankful for your time with providing feedback on our clinic survey. You may receive a survey via email or text message in the next few days.     To schedule another MTM appointment, please call the clinic directly or you may call the scheduling line at [phone].    My Clinical Pharmacist's contact information:                                                      Please contact the clinic with any questions or concerns you have.      Gladis Ulrich, Pharm.D., BCACP  Medication Therapy Management Pharmacist

## 2025-07-07 ENCOUNTER — VIRTUAL VISIT (OUTPATIENT)
Dept: ENDOCRINOLOGY | Facility: CLINIC | Age: 70
End: 2025-07-07
Payer: COMMERCIAL

## 2025-07-07 DIAGNOSIS — E21.0 HYPERPARATHYROIDISM, PRIMARY: ICD-10-CM

## 2025-07-07 DIAGNOSIS — R79.89 LOW TESTOSTERONE: Primary | ICD-10-CM

## 2025-07-07 DIAGNOSIS — E83.52 HYPERCALCEMIA: ICD-10-CM

## 2025-07-07 DIAGNOSIS — R94.8 ABNORMAL RESULTS OF FUNCTION STUDIES OF OTHER ORGANS AND SYSTEMS: ICD-10-CM

## 2025-07-07 DIAGNOSIS — E03.9 HYPOTHYROIDISM, UNSPECIFIED TYPE: ICD-10-CM

## 2025-07-07 PROCEDURE — G2211 COMPLEX E/M VISIT ADD ON: HCPCS | Performed by: STUDENT IN AN ORGANIZED HEALTH CARE EDUCATION/TRAINING PROGRAM

## 2025-07-07 PROCEDURE — 98007 SYNCH AUDIO-VIDEO EST HI 40: CPT | Performed by: STUDENT IN AN ORGANIZED HEALTH CARE EDUCATION/TRAINING PROGRAM

## 2025-07-07 PROCEDURE — 1126F AMNT PAIN NOTED NONE PRSNT: CPT | Mod: 95 | Performed by: STUDENT IN AN ORGANIZED HEALTH CARE EDUCATION/TRAINING PROGRAM

## 2025-07-07 RX ORDER — SIMVASTATIN 40 MG
TABLET ORAL
COMMUNITY

## 2025-07-07 ASSESSMENT — PAIN SCALES - GENERAL: PAINLEVEL_OUTOF10: NO PAIN (0)

## 2025-07-07 NOTE — PATIENT INSTRUCTIONS
CALCIUM Recommendation 1000 mg/day in divided dose of no more than 500 mg/dose. This includes what is in your food and also what is in any supplements you are taking.      Dietary sources of calcium: These also contain vitamin D  Milk / buttermilk              8 oz            300 mg  Dry milk powder       5 Tbsp             300 mg  Yogurt                          1 cup           400 mg  Ice cream   1/2 cup          100 mg  Hard cheese                     1.5 oz          300 mg  Cottage cheese                1/2 cup        65 mg  Spinach, cooked  1 cup  240 mg  Tofu, soft regular           4 oz          120 to 390 mg  Sardines                       3 oz               370 mg  Mackerel canned         3 oz                250 mg  Canned salmon with bones 3 oz        170-210 mg  Calcium fortified cereals are available  SILK soy and almond milk products are calcium fortified  Orange juice  Fortified with Calcium       8 oz            300 mg  Low fat dairy sources are recommended        24 Hour Urine Collection instruction    Decide a day you want to collect the urine for 24 hours.   On the day of collection, discard the first void urine in the morning.   Start collecting all the urine in the provided container for the entire day and over night.   The next morning when you wake up collect the first void urine in the container and then submit the container to the lab.  Store in the refrigerator    --------------------------------------------------------------------------

## 2025-07-07 NOTE — PROGRESS NOTES
Endocrinology Clinic Visit     Video-Visit Details     Type of service:  Video Visit    Joined the call at 7/7/2025, 8:38:56 am.  Left the call at 7/7/2025, 8:58:08 am.    I spent a total of 40 minutes on the date of encounter reviewing medical records, evaluating the patient, coordinating care and documenting in the EHR, as detailed above.        Patient location home  Provider location off site    Platform used for Video Visit: [employer]     NAME:  Miquel Jimenez  PCP:  Milton Culver Dre  MRN:  [id number]  Reason for Consult:  Hypogonadism and hypothyroidism  Requesting Provider:  Referred Self    Chief Complaint     Chief Complaint   Patient presents with    RECHECK       History of Present Illness     Miquel Jimenez is a 67 year old male who is seen in video visit for hypogonadism and hypothyroidism. Patient was following previously with Dr. Bridges. He established with me on 9/2022. Last seen 7/2024.    He was diagnosed with hypogonadism in 2012 by his PCP and has been on T replacement since then. He presented with low libido, low energy and feeling emotional. He looked at his previous labs ( scanned and attached to [facility]), his T level was 249 and his free was normal at 9. No gonadotropin and no other work up. When he established with Dr. Bridges prolactin was checked and was normal.     He was initially on depo-testosterone and then changed to Androgel. Symptoms improved shortly after medication started. He was taking  40.5 mg of testosterone via gel 2 packets once daily (1.62% concentration).      No history of  surgery.   No TBI. Reported previous MRI of brain years ago was done and was normal.     He has STAN and uses a CPAP. This was diagnosed around the same time as his hypogonadism.     Interval hx:   He is doing great, he has no complaints or concerns.  No urinary sx. PSA at the upper limit , remained stable.  On 3/2024 his hematocrit was up to 55.7  The dose of androgel was cut down to 1  pack daily  Most recent labs    Latest Reference Range & Units 01/06/25 07:48   Free Testosterone Calculated ng/dL 4.44   PSA Tumor Marker 0.00 - 4.50 ng/mL 3.72   Testosterone Total 240 - 950 ng/dL 177 (L)   Hematocrit 40.0 - 53.0 % 52.5   (L): Data is abnormally low    # hypothyroidism     He reported being diagnosed with hypothyroidism around the same time. The labs that he showed me back in 2012 had a normal TSH and no FT4 check. He reported being on lt4 at 100 mcg daily since diagnosis. He denied any symptoms of hypo or hyperthyroidism.   Last TSH 11/2024 wnl    # hypercalcemia  Noted on labs since 2020. He reported he was told about it when [place] was [place] in the [place]. Never did a 24 hour urine collection but had dexa scan done in the past, no reports.     Latest Ref Rng 10/3/2024  8:15 AM 11/11/2024  7:51 AM   ENDO CALCIUM LABS-[facility]      25 OH Vit D total 20 - 75 ug/L  <43    25 OH Vit D2 ug/L  <5    25 OH Vit D3 ug/L  38    Vitamin D Deficiency screening 20 - 75 ug/L     Vitamin D, Total (25-Hydroxy) 20 - 50 ng/mL     Albumin 3.4 - 5.0 g/dL     Albumin 3.5 - 5.2 g/dL  4.5    Alkaline Phosphatase 40 - 150 U/L     CALCIUM IONIZED WB 4.4 - 5.2 mg/dL 5.4 (H)     Calcium 8.8 - 10.4 mg/dL  11.1 (H)    Urea Nitrogen 8 - 22 mg/dL     Urea Nitrogen 8.0 - 23.0 mg/dL     Creatinine 0.67 - 1.17 mg/dL  0.87    Magnesium 1.7 - 2.3 mg/dL  2.2    Parathyroid Hormone Intact 15 - 65 pg/mL  40          Calcium intake: Dietary: no yogurt, no milk, cheese almost daily. Supplements: no    Vitamin D intake: Supplements: no stopped spring 2025, takes it during winter. Lots of sun exposure during summer.     Previous fractures: No  Family history of fragility fracture in parent: Yes: mother had femur fx  History of kidney stones: No  History of kidney disease: No  Family history of any calcium problems or kidney stones: No  History of vitamin D deficiency: No  History of HCTZ use: Yes: stopped 3/2025  History of  Lithium use: No  History of malabsorption (IBD, Celiac, gastric bypass ): No  History of thyroid disease: Yes: as above  Weight history: recently started zepbound , lost 25 lbs in the past 9 weeks.        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    Problem List     Hypothyroidism  Hypogonadism  STAN  HLD  HTN  Medications     Current Outpatient Medications   Medication Sig Dispense Refill    buPROPion (WELLBUTRIN SR) 100 MG 12 hr tablet Take 100 mg by mouth 2 times daily.      celecoxib (CELEBREX) 200 MG capsule Take 200 mg by mouth daily.      fish oil-omega-3 fatty acids 1000 MG capsule Take 1 g by mouth daily.      levothyroxine (SYNTHROID/LEVOTHROID) 100 MCG tablet Take 100 mcg by mouth daily.      lisinopril (ZESTRIL) 40 MG tablet Take 40 mg by mouth daily.      simvastatin (ZOCOR) 40 MG tablet TAKE ONE TABLET BY MOUTH DAILY IN THE EVENING.; Duration: 90 days      testosterone 40.5 MG/2.5GM (1.62%) GEL Place 1 packet (40.5 mg) onto the skin daily. 90 packet 0    vitamin C (ASCORBIC ACID) 1000 MG TABS Take 1,000 mg by mouth daily.      ZEPBOUND 7.5 MG/0.5ML prefilled pen Inject 7.5 mg subcutaneously every 7 days.      zinc gluconate 50 MG tablet Take 50 mg by mouth daily.      loratadine 10 MG capsule Take 10 mg by mouth daily.       No current facility-administered medications for this visit.        Allergies     No Known Allergies    Medical / Surgical History   Hypothyroidism  Hypogonadism  STAN  HLD  HTN    Social History     Social History     Socioeconomic History    Marital status:      Spouse name: Not on file    Number of children: Not on file    Years of education: Not on file    Highest education level: Not on file   Occupational History    Not on file   Tobacco Use    Smoking status: Never    Smokeless tobacco: Former     Quit date: 1980   Substance and Sexual Activity    Alcohol use: Not on file    Drug use: Not on file    Sexual activity: Not on  file   Other Topics Concern    Not on file   Social History Narrative    Not on file     Social Drivers of Health     Financial Resource Strain: Not on file   Food Insecurity: Not on file   Transportation Needs: Not on file   Physical Activity: Not on file   Stress: Not on file   Social Connections: Not on file   Interpersonal Safety: Not on file   Housing Stability: Not on file       Family History     Non contributory     ROS     12 ROS completed, pertinent positive and negative in HPI    Physical Exam   There were no vitals taken for this visit.   GENERAL: Healthy, alert and no distress  EYES: Eyes grossly normal to inspection.  No discharge or erythema, or obvious scleral/conjunctival abnormalities.  RESP: No audible wheeze, cough, or visible cyanosis.  No visible retractions or increased work of breathing.    SKIN: Visible skin clear. No significant rash, abnormal pigmentation or lesions.  NEURO: Cranial nerves grossly intact.  Mentation and speech appropriate for age.  PSYCH: Mentation appears normal, affect normal/bright, judgement and insight intact, normal speech and appearance well-groomed.     Labs/Imaging     Pertinent Labs were reviewed and updated in EPIC and discussed briefly.  Radiology Results were  reviewed and updated in EPIC and discussed briefly.    Summary of recent findings:   No results found for: A1C    TSH   Date Value Ref Range Status   11/11/2024 1.41 0.30 - 4.20 uIU/mL Final   09/24/2024 1.06 0.30 - 4.20 uIU/mL Final   11/28/2023 1.29 0.30 - 4.20 uIU/mL Final   06/05/2023 1.69 0.30 - 4.20 uIU/mL Final   06/03/2022 1.25 0.30 - 5.00 uIU/mL Final   12/14/2021 1.36 0.40 - 4.00 mU/L Final   09/08/2021 0.69 0.40 - 4.00 mU/L Final   08/11/2020 1.02 0.30 - 5.00 uIU/mL Final       Creatinine   Date Value Ref Range Status   04/22/2025 0.98 0.67 - 1.17 mg/dL Final       Recent Labs   Lab Test 06/03/22  1128 10/19/21  0956   CHOL 179 191   HDL 41 40    115   TRIG 172* 178*       No results  "found for: \"[id number]\", \"[id number]\", \"[id number]\"    I personally reviewed the patient's outside records from [facility] EMR and scanned records. Summary of pertinent findings in HPI.    Impression / Plan     1. Hypogonadism  2. Elevated hematocrit   Long history on T replacement since 2012. Diagnosis was not confirmed at that time, he had slightly low total T with normal Free T. No other hormonal work up. We discussed that at this point after being on T replacment for long time his pituitary access has been suppressed. He prefers to stay on it as his symptoms did improve on it. We reviewed during our initial visit T side effects including polycythemia and association with CVD and prostate cancer.    His hematocrit trended up to 55 on 3/2024. We decreased his T from 2 pack daily to 1 pack daily. HIS hematocrit trended down TO 52 ON 1/2025, testosterone levels are down as well but he did not feel any difference. He is due for labs, we discussed if hematocrit remains elevated we will consider hematology referral.    PSA was trending up but not more than 1.5 increase and still <4.  We discussed if any of these changes happen to his PSA we will plan for urology referral. He has no known BPH and no obstructive urinary sx. we will continue to monitor.    Plan:  continue Transdermal T 1 pack daily  Checking T level, hematocrit and PSA       2. Hypothyroidism  No previous records of abnormal labs. He has been on LT4 100 mcg daily since 2012. Most likely primary hypothyroidism though no labs. Last TSH wnl November 2023    Plan:  Yearly TFT   Continue LT4 100 mcg daily    3.  Mild intermittent hypercalcemia  Work up concerning for PHPTH vs FHH. We will plan on rechecking labs, getting 24 hour urine collection for calcium check and dexa scan. We briefly reviewed indication for parathyroidectomy in case of PHPTH.    4. Obesity,  CLASS 2  Following with wt management clinic, on zepbound.      Test and/or medications prescribed " today:  Orders Placed This Encounter   Procedures    DX Bone Density    PSA tumor marker    CBC with platelets    25 Hydroxyvitamin D2 and D3    Albumin level    Calcium    Magnesium    Parathyroid Hormone Intact    Phosphorus    Creatinine    Calcium timed urine    Creatinine timed urine    Testosterone Free and Total         Follow up: 2 month  The longitudinal plan of care for the diagnosis(es)/condition(s) as documented were addressed during this visit. Due to the added complexity in care, I will continue to support Bill in the subsequent management and with ongoing continuity of care.      Deo Still MD  Endocrinology, Diabetes and Metabolism  [facility]

## 2025-07-07 NOTE — LETTER
7/7/2025      Miquel Jimenez  [address]  [facility] [address]      Dear Colleague,    Thank you for referring your patient, Miquel Jimenez, to the [facility]. Please see a copy of my visit note below.    Endocrinology Clinic Visit     Video-Visit Details     Type of service:  Video Visit    Joined the call at 7/7/2025, 8:38:56 am.  Left the call at 7/7/2025, 8:58:08 am.    I spent a total of 40 minutes on the date of encounter reviewing medical records, evaluating the patient, coordinating care and documenting in the EHR, as detailed above.        Patient location home  Provider location off site    Platform used for Video Visit: [facility]     NAME:  Miquel Jimenez  PCP:  Milton Culver  MRN:  [id number]  Reason for Consult:  Hypogonadism and hypothyroidism  Requesting Provider:  Referred Self    Chief Complaint     Chief Complaint   Patient presents with     RECHECK       History of Present Illness     Miquel Jimenez is a 67 year old male who is seen in video visit for hypogonadism and hypothyroidism. Patient was following previously with Dr. Bridges. He established with me on 9/2022. Last seen 7/2024.    He was diagnosed with hypogonadism in 2012 by his PCP and has been on T replacement since then. He presented with low libido, low energy and feeling emotional. He looked at his previous labs ( scanned and attached to [facility]), his T level was 249 and his free was normal at 9. No gonadotropin and no other work up. When he established with Dr. Bridges prolactin was checked and was normal.     He was initially on depo-testosterone and then changed to Androgel. Symptoms improved shortly after medication started. He was taking  40.5 mg of testosterone via gel 2 packets once daily (1.62% concentration).      No history of  surgery.   No TBI. Reported previous MRI of brain years ago was done and was normal.     He has STAN and uses a CPAP. This was diagnosed around the same  time as his hypogonadism.     Interval hx:   He is doing great, he has no complaints or concerns.  No urinary sx. PSA at the upper limit , remained stable.  On 3/2024 his hematocrit was up to 55.7  The dose of androgel was cut down to 1 pack daily  Most recent labs    Latest Reference Range & Units 01/06/25 07:48   Free Testosterone Calculated ng/dL 4.44   PSA Tumor Marker 0.00 - 4.50 ng/mL 3.72   Testosterone Total 240 - 950 ng/dL 177 (L)   Hematocrit 40.0 - 53.0 % 52.5   (L): Data is abnormally low    # hypothyroidism     He reported being diagnosed with hypothyroidism around the same time. The labs that he showed me back in 2012 had a normal TSH and no FT4 check. He reported being on lt4 at 100 mcg daily since diagnosis. He denied any symptoms of hypo or hyperthyroidism.   Last TSH 11/2024 wnl    # hypercalcemia  Noted on labs since 2020. He reported he was told about it when [place] was [place] the [place]. Never did a 24 hour urine collection but had dexa scan done in the past, no reports.     Latest Ref Rng 10/3/2024  8:15 AM 11/11/2024  7:51 AM   ENDO CALCIUM LABS-[facility]      25 OH Vit D total 20 - 75 ug/L  <43    25 OH Vit D2 ug/L  <5    25 OH Vit D3 ug/L  38    Vitamin D Deficiency screening 20 - 75 ug/L     Vitamin D, Total (25-Hydroxy) 20 - 50 ng/mL     Albumin 3.4 - 5.0 g/dL     Albumin 3.5 - 5.2 g/dL  4.5    Alkaline Phosphatase 40 - 150 U/L     CALCIUM IONIZED WB 4.4 - 5.2 mg/dL 5.4 (H)     Calcium 8.8 - 10.4 mg/dL  11.1 (H)    Urea Nitrogen 8 - 22 mg/dL     Urea Nitrogen 8.0 - 23.0 mg/dL     Creatinine 0.67 - 1.17 mg/dL  0.87    Magnesium 1.7 - 2.3 mg/dL  2.2    Parathyroid Hormone Intact 15 - 65 pg/mL  40          Calcium intake: Dietary: no yogurt, no milk, cheese almost daily. Supplements: no    Vitamin D intake: Supplements: no stopped spring 2025, takes it during winter. Lots of sun exposure during summer.     Previous fractures: No  Family history of fragility fracture in parent: Yes:  mother had femur fx  History of kidney stones: No  History of kidney disease: No  Family history of any calcium problems or kidney stones: No  History of vitamin D deficiency: No  History of HCTZ use: Yes: stopped 3/2025  History of Lithium use: No  History of malabsorption (IBD, Celiac, gastric bypass ): No  History of thyroid disease: Yes: as above  Weight history: recently started zepbound , lost 25 lbs in the past 9 weeks.        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    Problem List     Hypothyroidism  Hypogonadism  STAN  HLD  HTN  Medications     Current Outpatient Medications   Medication Sig Dispense Refill     buPROPion (WELLBUTRIN SR) 100 MG 12 hr tablet Take 100 mg by mouth 2 times daily.       celecoxib (CELEBREX) 200 MG capsule Take 200 mg by mouth daily.       fish oil-omega-3 fatty acids 1000 MG capsule Take 1 g by mouth daily.       levothyroxine (SYNTHROID/LEVOTHROID) 100 MCG tablet Take 100 mcg by mouth daily.       lisinopril (ZESTRIL) 40 MG tablet Take 40 mg by mouth daily.       simvastatin (ZOCOR) 40 MG tablet TAKE ONE TABLET BY MOUTH DAILY IN THE EVENING.; Duration: 90 days       testosterone 40.5 MG/2.5GM (1.62%) GEL Place 1 packet (40.5 mg) onto the skin daily. 90 packet 0     vitamin C (ASCORBIC ACID) 1000 MG TABS Take 1,000 mg by mouth daily.       ZEPBOUND 7.5 MG/0.5ML prefilled pen Inject 7.5 mg subcutaneously every 7 days.       zinc gluconate 50 MG tablet Take 50 mg by mouth daily.       loratadine 10 MG capsule Take 10 mg by mouth daily.       No current facility-administered medications for this visit.        Allergies     No Known Allergies    Medical / Surgical History   Hypothyroidism  Hypogonadism  STAN  HLD  HTN    Social History     Social History     Socioeconomic History     Marital status:      Spouse name: Not on file     Number of children: Not on file     Years of education: Not on file     Highest education level:  Not on file   Occupational History     Not on file   Tobacco Use     Smoking status: Never     Smokeless tobacco: Former     Quit date: 1980   Substance and Sexual Activity     Alcohol use: Not on file     Drug use: Not on file     Sexual activity: Not on file   Other Topics Concern     Not on file   Social History Narrative     Not on file     Social Drivers of Health     Financial Resource Strain: Not on file   Food Insecurity: Not on file   Transportation Needs: Not on file   Physical Activity: Not on file   Stress: Not on file   Social Connections: Not on file   Interpersonal Safety: Not on file   Housing Stability: Not on file       Family History     Non contributory     ROS     12 ROS completed, pertinent positive and negative in HPI    Physical Exam   There were no vitals taken for this visit.   GENERAL: Healthy, alert and no distress  EYES: Eyes grossly normal to inspection.  No discharge or erythema, or obvious scleral/conjunctival abnormalities.  RESP: No audible wheeze, cough, or visible cyanosis.  No visible retractions or increased work of breathing.    SKIN: Visible skin clear. No significant rash, abnormal pigmentation or lesions.  NEURO: Cranial nerves grossly intact.  Mentation and speech appropriate for age.  PSYCH: Mentation appears normal, affect normal/bright, judgement and insight intact, normal speech and appearance well-groomed.     Labs/Imaging     Pertinent Labs were reviewed and updated in EPIC and discussed briefly.  Radiology Results were  reviewed and updated in EPIC and discussed briefly.    Summary of recent findings:   No results found for: A1C    TSH   Date Value Ref Range Status   11/11/2024 1.41 0.30 - 4.20 uIU/mL Final   09/24/2024 1.06 0.30 - 4.20 uIU/mL Final   11/28/2023 1.29 0.30 - 4.20 uIU/mL Final   06/05/2023 1.69 0.30 - 4.20 uIU/mL Final   06/03/2022 1.25 0.30 - 5.00 uIU/mL Final   12/14/2021 1.36 0.40 - 4.00 mU/L Final   09/08/2021 0.69 0.40 - 4.00 mU/L Final  "  08/11/2020 1.02 0.30 - 5.00 uIU/mL Final       Creatinine   Date Value Ref Range Status   04/22/2025 0.98 0.67 - 1.17 mg/dL Final       Recent Labs   Lab Test 06/03/22  1128 10/19/21  0956   CHOL 179 191   HDL 41 40    115   TRIG 172* 178*       No results found for: \"[id number]\", \"[id number]\", \"[id number]\"    I personally reviewed the patient's outside records from [facility] EMR and scanned records. Summary of pertinent findings in HPI.    Impression / Plan     1. Hypogonadism  2. Elevated hematocrit   Long history on T replacement since 2012. Diagnosis was not confirmed at that time, he had slightly low total T with normal Free T. No other hormonal work up. We discussed that at this point after being on T replacment for long time his pituitary access has been suppressed. He prefers to stay on it as his symptoms did improve on it. We reviewed during our initial visit T side effects including polycythemia and association with CVD and prostate cancer.    His hematocrit trended up to 55 on 3/2024. We decreased his T from 2 pack daily to 1 pack daily. HIS hematocrit trended down TO 52 ON 1/2025, testosterone levels are down as well but he did not feel any difference. He is due for labs, we discussed if hematocrit remains elevated we will consider hematology referral.    PSA was trending up but not more than 1.5 increase and still <4.  We discussed if any of these changes happen to his PSA we will plan for urology referral. He has no known BPH and no obstructive urinary sx. we will continue to monitor.    Plan:  continue Transdermal T 1 pack daily  Checking T level, hematocrit and PSA       2. Hypothyroidism  No previous records of abnormal labs. He has been on LT4 100 mcg daily since 2012. Most likely primary hypothyroidism though no labs. Last TSH wnl November 2023    Plan:  Yearly TFT   Continue LT4 100 mcg daily    3.  Mild intermittent hypercalcemia  Work up concerning for PHPTH vs FHH. We will plan on " rechecking labs, getting 24 hour urine collection for calcium check and dexa scan. We briefly reviewed indication for parathyroidectomy in case of PHPTH.    4. Obesity,  CLASS 2  Following with wt management clinic, on zepbound.      Test and/or medications prescribed today:  Orders Placed This Encounter   Procedures     DX Bone Density     PSA tumor marker     CBC with platelets     25 Hydroxyvitamin D2 and D3     Albumin level     Calcium     Magnesium     Parathyroid Hormone Intact     Phosphorus     Creatinine     Calcium timed urine     Creatinine timed urine     Testosterone Free and Total         Follow up: 2 month  The longitudinal plan of care for the diagnosis(es)/condition(s) as documented were addressed during this visit. Due to the added complexity in care, I will continue to support Bill in the subsequent management and with ongoing continuity of care.      Doe Still MD  Endocrinology, Diabetes and Metabolism  [facility]        Again, thank you for allowing me to participate in the care of your patient.        Sincerely,        Deo Still MD    Electronically signed

## 2025-07-07 NOTE — NURSING NOTE
Current patient location: Patient declined to provide     Is the patient currently in the state of [place]? YES    Visit mode: VIDEO    If the visit is dropped, the patient can be reconnected by:VIDEO VISIT: Text to cell phone:   Telephone Information:   Mobile [phone]       Will anyone else be joining the visit? NO  (If patient encounters technical issues they should call [phone])    Are changes needed to the allergy or medication list? No    Are refills needed on medications prescribed by this physician? NO    Rooming Documentation:  Questionnaire(s) not done per department protocol    Reason for visit: RECHECK Shelby Kocher VVF

## 2025-07-08 ENCOUNTER — ANCILLARY PROCEDURE (OUTPATIENT)
Dept: BONE DENSITY | Facility: CLINIC | Age: 70
End: 2025-07-08
Attending: STUDENT IN AN ORGANIZED HEALTH CARE EDUCATION/TRAINING PROGRAM
Payer: COMMERCIAL

## 2025-07-08 DIAGNOSIS — E21.0 HYPERPARATHYROIDISM, PRIMARY: ICD-10-CM

## 2025-07-08 DIAGNOSIS — E83.52 HYPERCALCEMIA: ICD-10-CM

## 2025-07-08 PROCEDURE — 77081 DXA BONE DENSITY APPENDICULR: CPT | Mod: TC | Performed by: RADIOLOGY

## 2025-07-08 PROCEDURE — 77080 DXA BONE DENSITY AXIAL: CPT | Mod: TC | Performed by: RADIOLOGY

## 2025-07-10 ENCOUNTER — LAB (OUTPATIENT)
Dept: LAB | Facility: CLINIC | Age: 70
End: 2025-07-10
Payer: COMMERCIAL

## 2025-07-10 DIAGNOSIS — E83.52 HYPERCALCEMIA: ICD-10-CM

## 2025-07-10 DIAGNOSIS — R79.89 LOW TESTOSTERONE: ICD-10-CM

## 2025-07-10 DIAGNOSIS — R94.8 ABNORMAL RESULTS OF FUNCTION STUDIES OF OTHER ORGANS AND SYSTEMS: ICD-10-CM

## 2025-07-10 LAB
ALBUMIN SERPL BCG-MCNC: 4.5 G/DL (ref 3.5–5.2)
CALCIUM SERPL-MCNC: 11 MG/DL (ref 8.8–10.4)
CREAT SERPL-MCNC: 0.95 MG/DL (ref 0.67–1.17)
EGFRCR SERPLBLD CKD-EPI 2021: 87 ML/MIN/1.73M2
ERYTHROCYTE [DISTWIDTH] IN BLOOD BY AUTOMATED COUNT: 12.4 % (ref 10–15)
HCT VFR BLD AUTO: 51 % (ref 40–53)
HGB BLD-MCNC: 17.1 G/DL (ref 13.3–17.7)
MAGNESIUM SERPL-MCNC: 2.2 MG/DL (ref 1.7–2.3)
MCH RBC QN AUTO: 31 PG (ref 26.5–33)
MCHC RBC AUTO-ENTMCNC: 33.5 G/DL (ref 31.5–36.5)
MCV RBC AUTO: 92 FL (ref 78–100)
PHOSPHATE SERPL-MCNC: 2.8 MG/DL (ref 2.5–4.5)
PLATELET # BLD AUTO: 189 10E3/UL (ref 150–450)
PSA SERPL DL<=0.01 NG/ML-MCNC: 4.53 NG/ML (ref 0–4.5)
PTH-INTACT SERPL-MCNC: 47 PG/ML (ref 15–65)
RBC # BLD AUTO: 5.52 10E6/UL (ref 4.4–5.9)
SHBG SERPL-SCNC: 27 NMOL/L (ref 11–80)
WBC # BLD AUTO: 4.9 10E3/UL (ref 4–11)

## 2025-07-13 LAB
TESTOST FREE SERPL-MCNC: 6.18 NG/DL
TESTOST SERPL-MCNC: 282 NG/DL (ref 240–950)

## 2025-07-14 LAB
CALCIUM 24H UR-MRATE: 0.55 G/SPEC (ref 0.1–0.3)
CALCIUM UR-MCNC: 15.8 MG/DL
COLLECT DURATION TIME UR: 24 H
COLLECT DURATION TIME UR: 24 H
CREAT 24H UR-MRATE: 2.17 G/SPEC (ref 0.98–2.2)
CREAT UR-MCNC: 61.9 MG/DL
DEPRECATED CALCIDIOL+CALCIFEROL SERPL-MC: <37 UG/L (ref 20–75)
SPECIMEN VOL UR: 3500 ML
SPECIMEN VOL UR: 3500 ML
VITAMIN D2 SERPL-MCNC: <5 UG/L
VITAMIN D3 SERPL-MCNC: 32 UG/L

## 2025-07-18 ENCOUNTER — ANCILLARY PROCEDURE (OUTPATIENT)
Dept: ULTRASOUND IMAGING | Facility: CLINIC | Age: 70
End: 2025-07-18
Attending: STUDENT IN AN ORGANIZED HEALTH CARE EDUCATION/TRAINING PROGRAM
Payer: COMMERCIAL

## 2025-07-18 DIAGNOSIS — E83.52 HYPERCALCEMIA: ICD-10-CM

## 2025-07-18 DIAGNOSIS — E21.0 HYPERPARATHYROIDISM, PRIMARY: ICD-10-CM

## 2025-07-18 PROCEDURE — 76536 US EXAM OF HEAD AND NECK: CPT | Mod: GC | Performed by: RADIOLOGY

## 2025-07-19 NOTE — TELEPHONE ENCOUNTER
MEDICAL RECORDS REQUEST   [facility]  Urology Clinic  [address] [place] [address]  PHONE: [phone]  Fax: [phone]        FUTURE VISIT INFORMATION                                                   Miquel Jimenez : 1955 scheduled for future visit at [facility]    APPOINTMENT INFORMATION:  Date: 2025  Provider:  Calvin Knott PA-C  Reason for Visit/Diagnosis: elevated PSA    REFERRAL INFORMATION:  Referring provider:  Deo Still MD in [facility] LABORATORY      RECORDS REQUESTED FOR VISIT                                                     NOTES  STATUS/DETAILS   OFFICE NOTE from referring provider  2025 -- Deo Still MD in [facility]   MEDICATION LIST  yes   LABS     PSA  yes     PRE-VISIT CHECKLIST      Joint diagnostic appointment coordinated correctly          (ensure right order & amount of time) Yes   RECORD COLLECTION COMPLETE Yes

## 2025-07-21 ENCOUNTER — PATIENT OUTREACH (OUTPATIENT)
Dept: CARE COORDINATION | Facility: CLINIC | Age: 70
End: 2025-07-21
Payer: COMMERCIAL

## 2025-07-29 ENCOUNTER — OFFICE VISIT (OUTPATIENT)
Dept: PHARMACY | Facility: PHYSICIAN GROUP | Age: 70
End: 2025-07-29
Payer: COMMERCIAL

## 2025-07-29 VITALS
SYSTOLIC BLOOD PRESSURE: 120 MMHG | WEIGHT: 223.8 LBS | HEART RATE: 66 BPM | BODY MASS INDEX: 31.21 KG/M2 | DIASTOLIC BLOOD PRESSURE: 74 MMHG

## 2025-07-29 DIAGNOSIS — I10 HYPERTENSION, ESSENTIAL: Primary | ICD-10-CM

## 2025-07-29 DIAGNOSIS — E66.09 OBESITY DUE TO EXCESS CALORIES, UNSPECIFIED OBESITY SEVERITY: ICD-10-CM

## 2025-07-29 PROCEDURE — 99606 MTMS BY PHARM EST 15 MIN: CPT | Performed by: PHARMACIST

## 2025-07-29 PROCEDURE — 99607 MTMS BY PHARM ADDL 15 MIN: CPT | Performed by: PHARMACIST

## 2025-07-29 PROCEDURE — 3078F DIAST BP <80 MM HG: CPT | Performed by: PHARMACIST

## 2025-07-29 PROCEDURE — 3074F SYST BP LT 130 MM HG: CPT | Performed by: PHARMACIST

## 2025-07-29 NOTE — PATIENT INSTRUCTIONS
Recommendations from today's MTM visit:                                                      ASSESSMENT:                            Medication Adherence/Access: No issues identified    Hypertension   Stable.    Weight Management /STAN  Stable, continues to gradually lose weight. Some side effects on current dose, so will keep the same for now. Can discontinue bupropion, as this was started for weight loss and has not been effective.     PLAN:                            1. Stop taking bupropion:     - Take one pill a day for 7 days     - Then stop completely    2. Continue Zepbound 7.5 mg as prescribed    Follow-up: Tuesday, September 16th at 9:00 AM    It was great to speak with you today.  I value your experience and would be very thankful for your time with providing feedback on our clinic survey. You may receive a survey via email or text message in the next few days.     To schedule another MTM appointment, please call the clinic directly or you may call the scheduling line at [phone].    My Clinical Pharmacist's contact information:                                                      Please contact the clinic with any questions or concerns you have.      Gladis Ulrich, Pharm.D., BCACP  Medication Therapy Management Pharmacist

## 2025-07-29 NOTE — PROGRESS NOTES
Medication Therapy Management (MTM) Encounter    ASSESSMENT:                            Medication Adherence/Access: No issues identified    Hypertension   Stable.    Weight Management /STAN  Stable, continues to gradually lose weight. Some side effects on current dose, so will keep the same for now. Can discontinue bupropion, as this was started for weight loss and has not been effective.     PLAN:                            1. Stop taking bupropion:     - Take one pill a day for 7 days     - Then stop completely    2. Continue Zepbound 7.5 mg as prescribed    Follow-up: Tuesday, September 16th at 9:00 AM      SUBJECTIVE/OBJECTIVE:                          Gene Jimenez is a 69 year old male seen for a follow-up visit.     Reason for visit: Medication review.    Allergies/ADRs: None  Past Medical History: Reviewed in chart  Tobacco: He reports that he has never smoked. He quit smokeless tobacco use about 45 years ago.  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  Home BP monitoring  has not been monitoring recently  Medication History:  Hydrochlorothiazide 25mg daily - discontinued 3/19 d/t ED side effects which improved off of medication    BP Readings from Last 3 Encounters:   07/29/25 120/74   07/01/25 122/88   05/28/25 (!) 140/80          Weight Management /STAN  Bupropion SR 100mg twice daily - never really helped much, ok to discontinue  Zepbound 7.5 mg injected once weekly - increased after last visit and has taken 4 doses so far, notes increased gas on this dose    Nutrition/Eating Habits: Diet has been better, actively working on making healthy choices, but notes he has been less strict than previous in the last few weeks. Has reduced alcohol  "from 1-2/day to 1-2/week.   Exercise/Activity: Hasn't been going to gym as much recently due to working on a house project; upcoming trip to [place] and [facility]  Generally does 3 days per week at the gym and thinking of adding in swimming on the other days, active with woodworking and gardening (weather permitting). Tracks diet and exercise on an eleonora on his phone  Medications Tried/Failed:  None.   Medical History:  MEN2/Medullary Thyroid Cancer: none  Pancreatitis: none  Baseline GI symptomatology: none     Initial Consult Weight: 252 (9/24/24)   Current weight today: 223 lbs 12.8 oz   Cumulative Weight loss: -29b, -11.5% from baseline    Wt Readings from Last 4 Encounters:   07/29/25 223 lb 12.8 oz (101.5 kg)   07/01/25 231 lb (104.8 kg)   05/28/25 232 lb 6.4 oz (105.4 kg)   04/30/25 249 lb (112.9 kg)     Estimated body mass index is 31.21 kg/m  as calculated from the following:    Height as of 4/30/25: 5' 11\" (1.803 m).    Weight as of this encounter: 223 lb 12.8 oz (101.5 kg).      Today's Vitals: /74   Pulse 66   Wt 223 lb 12.8 oz (101.5 kg)   BMI 31.21 kg/m    ----------------      I spent 30minutes with this patient today. All changes were made via collaborative practice agreement with Joseph Diaz. A copy of the visit note was provided to the patient's provider(s).    A summary of these recommendations was given to the patient.    Gladis Ulrich, Pharm.D., BCACP  Medication Therapy Management Pharmacist  [phone]        Medication Therapy Recommendations  Obesity due to excess calories, unspecified obesity severity   1 Current Medication: buPROPion (WELLBUTRIN SR) 100 MG 12 hr tablet (Discontinued)   Current Medication Sig: Take 100 mg by mouth 2 times daily.   Rationale: Condition refractory to medication - Ineffective medication - Effectiveness   Recommendation: Discontinue Medication   Status: Accepted per CPA   Identified Date: 7/29/2025 Completed Date: 7/29/2025 "

## 2025-09-02 ENCOUNTER — PRE VISIT (OUTPATIENT)
Dept: UROLOGY | Facility: CLINIC | Age: 70
End: 2025-09-02

## 2025-09-02 ENCOUNTER — OFFICE VISIT (OUTPATIENT)
Dept: UROLOGY | Facility: CLINIC | Age: 70
End: 2025-09-02
Attending: STUDENT IN AN ORGANIZED HEALTH CARE EDUCATION/TRAINING PROGRAM
Payer: COMMERCIAL

## 2025-09-02 VITALS
DIASTOLIC BLOOD PRESSURE: 86 MMHG | WEIGHT: 214 LBS | HEART RATE: 68 BPM | HEIGHT: 74 IN | BODY MASS INDEX: 27.46 KG/M2 | SYSTOLIC BLOOD PRESSURE: 129 MMHG | OXYGEN SATURATION: 95 %

## 2025-09-02 DIAGNOSIS — R39.12 BENIGN PROSTATIC HYPERPLASIA WITH WEAK URINARY STREAM: ICD-10-CM

## 2025-09-02 DIAGNOSIS — R97.20 ELEVATED PSA: Primary | ICD-10-CM

## 2025-09-02 DIAGNOSIS — R79.89 LOW TESTOSTERONE: ICD-10-CM

## 2025-09-02 DIAGNOSIS — N40.1 BENIGN PROSTATIC HYPERPLASIA WITH WEAK URINARY STREAM: ICD-10-CM

## 2025-09-02 DIAGNOSIS — N52.9 ERECTILE DYSFUNCTION, UNSPECIFIED ERECTILE DYSFUNCTION TYPE: ICD-10-CM

## 2025-09-02 PROCEDURE — 99204 OFFICE O/P NEW MOD 45 MIN: CPT | Performed by: STUDENT IN AN ORGANIZED HEALTH CARE EDUCATION/TRAINING PROGRAM

## 2025-09-02 PROCEDURE — 3074F SYST BP LT 130 MM HG: CPT | Performed by: STUDENT IN AN ORGANIZED HEALTH CARE EDUCATION/TRAINING PROGRAM

## 2025-09-02 PROCEDURE — 3079F DIAST BP 80-89 MM HG: CPT | Performed by: STUDENT IN AN ORGANIZED HEALTH CARE EDUCATION/TRAINING PROGRAM

## 2025-09-02 PROCEDURE — 1126F AMNT PAIN NOTED NONE PRSNT: CPT | Performed by: STUDENT IN AN ORGANIZED HEALTH CARE EDUCATION/TRAINING PROGRAM

## 2025-09-02 RX ORDER — TAMSULOSIN HYDROCHLORIDE 0.4 MG/1
0.4 CAPSULE ORAL DAILY
Qty: 30 CAPSULE | Refills: 11 | Status: CANCELLED | OUTPATIENT
Start: 2025-09-02

## 2025-09-02 RX ORDER — TADALAFIL 5 MG/1
5 TABLET ORAL EVERY 24 HOURS
Qty: 90 TABLET | Refills: 4 | Status: SHIPPED | OUTPATIENT
Start: 2025-09-02 | End: 2025-09-02

## 2025-09-02 RX ORDER — TADALAFIL 5 MG/1
5 TABLET ORAL EVERY 24 HOURS
Qty: 90 TABLET | Refills: 4 | Status: SHIPPED | OUTPATIENT
Start: 2025-09-02

## 2025-09-02 ASSESSMENT — PAIN SCALES - GENERAL: PAINLEVEL_OUTOF10: NO PAIN (0)
